# Patient Record
Sex: FEMALE | Race: WHITE | NOT HISPANIC OR LATINO | Employment: UNEMPLOYED | URBAN - METROPOLITAN AREA
[De-identification: names, ages, dates, MRNs, and addresses within clinical notes are randomized per-mention and may not be internally consistent; named-entity substitution may affect disease eponyms.]

---
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
## 2021-07-21 ENCOUNTER — TELEPHONE (OUTPATIENT)
Dept: OTOLARYNGOLOGY | Facility: CLINIC | Age: 21
End: 2021-07-21

## 2021-07-21 NOTE — TELEPHONE ENCOUNTER
[facility] Call Center    Phone Message    May a detailed message be left on voicemail: yes     Reason for Call: Symptoms or Concerns     If patient has red-flag symptoms, warm transfer to triage line    Current symptom or concern: Current ear infection with tube pressing up against inside of ear, pain.    Symptoms have been present for:  1 month(s)    Has patient previously been seen for this? Yes    By : Dr. Dong    Date: 8/7/18    Are there any new or worsening symptoms? Yes: Pt's mom Sania called in. Pt has been experiencing symptoms for about a month, has confirmed ear infection from another provider. Pt has pain. Mom states other provider noted the tube pressed up against the inside of ear and couldn't see through it.      Action Taken: Message routed to:  [facility] ([facility] ENT    Travel Screening: Not Applicable

## 2021-07-21 NOTE — TELEPHONE ENCOUNTER
Pt is having no discharge. Just pain and pressure/plugged feeling. Ran this past Dr. Dong who recommended pt to see Brittney Hallman NP to have her ears examed. No meds as she doesn't think it is an infection. Also it has been almost 3 years since we saw the pt, examination is needed. Pt's mom is agreement with plan.     Marianne Oropeza LPN

## 2021-07-22 ENCOUNTER — TELEPHONE (OUTPATIENT)
Dept: OTOLARYNGOLOGY | Facility: CLINIC | Age: 21
End: 2021-07-22

## 2021-07-22 NOTE — TELEPHONE ENCOUNTER
LVM to Schedule with Brittney Hallman, same day as Iker is here. OK to use return slot. Gave call center number

## 2021-07-26 ENCOUNTER — OFFICE VISIT (OUTPATIENT)
Dept: OTOLARYNGOLOGY | Facility: CLINIC | Age: 21
End: 2021-07-26
Payer: COMMERCIAL

## 2021-07-26 VITALS
BODY MASS INDEX: 29.18 KG/M2 | WEIGHT: 154.54 LBS | HEART RATE: 85 BPM | OXYGEN SATURATION: 99 % | TEMPERATURE: 97.8 F | HEIGHT: 61 IN

## 2021-07-26 DIAGNOSIS — H92.12 OTORRHEA, LEFT: Primary | ICD-10-CM

## 2021-07-26 PROCEDURE — 87186 SC STD MICRODIL/AGAR DIL: CPT | Mod: 90 | Performed by: PATHOLOGY

## 2021-07-26 PROCEDURE — 99203 OFFICE O/P NEW LOW 30 MIN: CPT | Mod: 25 | Performed by: REGISTERED NURSE

## 2021-07-26 PROCEDURE — 87077 CULTURE AEROBIC IDENTIFY: CPT | Mod: 90 | Performed by: PATHOLOGY

## 2021-07-26 PROCEDURE — 92504 EAR MICROSCOPY EXAMINATION: CPT | Performed by: REGISTERED NURSE

## 2021-07-26 PROCEDURE — 87102 FUNGUS ISOLATION CULTURE: CPT | Mod: 90 | Performed by: PATHOLOGY

## 2021-07-26 PROCEDURE — 87070 CULTURE OTHR SPECIMN AEROBIC: CPT | Mod: 90 | Performed by: PATHOLOGY

## 2021-07-26 RX ORDER — PREDNISOLONE ACETATE 10 MG/ML
SUSPENSION/ DROPS OPHTHALMIC
Qty: 10 ML | Refills: 1 | Status: SHIPPED | OUTPATIENT
Start: 2021-07-26 | End: 2021-08-05

## 2021-07-26 RX ORDER — CIPROFLOXACIN HYDROCHLORIDE 3.5 MG/ML
SOLUTION/ DROPS TOPICAL
Qty: 5 ML | Refills: 1 | Status: SHIPPED | OUTPATIENT
Start: 2021-07-26

## 2021-07-26 RX ORDER — CIPROFLOXACIN AND DEXAMETHASONE 3; 1 MG/ML; MG/ML
SUSPENSION/ DROPS AURICULAR (OTIC)
Qty: 7.5 ML | Refills: 0 | Status: SHIPPED | OUTPATIENT
Start: 2021-07-26 | End: 2021-08-05

## 2021-07-26 ASSESSMENT — MIFFLIN-ST. JEOR: SCORE: 1395.44

## 2021-07-26 ASSESSMENT — PAIN SCALES - GENERAL: PAINLEVEL: NO PAIN (0)

## 2021-07-26 NOTE — LETTER
7/26/2021       RE: Migdalia [place]  [address] [facility] [address]     Dear Colleague,    Thank you for referring your patient, Migdalia [place], to the [facility] EAR NOSE AND THROAT CLINIC [place] at [facility]. Please see a copy of my visit note below.      Otolaryngology Clinic  July 26, 2021    HPI:  Migdalia [place] is here for a left t-tube check. Patient had bilateral T tubes placed by Dr. Dong in 2018 for bilateral otitis media with effusions. Patient has been doing well since tube placement. Recently had new left ear pressure, saw PCP who prescribed oral antibiotics for an ear infection. PCP concerned about tube placement and recommended follow up with ENT for tube check. Patient states that left ear continues to have pressure. Hearing became muffled a few days ago, cannot pop ear anymore since hearing became muffled.    Patient denies any otalgia, otorrhea, or dizziness. Patient does not follow strict dry ear precautions but does not have significant ear exposure to water.    Otologic microscope exam:    Biocular Microscopy exam is needed due to deep impaction of cerumen of bilateral ears, requiring direct visualization for use of cleaning instruments.    Right ear was examined under the microscope.  T-tube in place with hard cerumen crusting around tube. Dr. Dong lifted crusting off of drum, could not remove crusting due to intolerance by patient.     Left ear was examined under the microscope.  T-tube visualized. Significant crusting occluding drum with moderate amount of purulent fluid behind crusting. Culture taken. Dr. Dong cleaned crusting with right angle hook around tube. Granulation tissue cleaned with suction. TM red, inflamed.     The patient noted improvement of left ear pressure.    Assessment and Plan:  1. Otorrhea, left  Patient with otitis externa of the left ear requiring ciprodex prescription. Will have patient use drops x 10 days  with follow up in 2 weeks for an ear recheck. Reviewed strict dry ear precautions with patient. Will monitor ear culture and contact patient if there would need to be a change in ear drops.    2. Crusting of right ear T-tube  Patient with crusting surrounding right PE tube. Will have patient use drops to soften crusting 1 week prior to follow up with plan to remove crusting around tube at next visit.      Patient will follow up 2 weeks for ear recheck and cleaning.    Brittney Hallman DNP, APRN, CNP  Otolaryngology  Head & Neck Surgery  [phone]    30 minutes spent on the date of the encounter doing chart review, history and exam, documentation and further activities per the note        Again, thank you for allowing me to participate in the care of your patient.      Sincerely,    Korin Hallman, NP

## 2021-07-26 NOTE — PROGRESS NOTES
Otolaryngology Clinic  July 26, 2021    HPI:  Migdalai [place] is here for a left t-tube check. Patient had bilateral T tubes placed by Dr. Dong in 2018 for bilateral otitis media with effusions. Patient has been doing well since tube placement. Recently had new left ear pressure, saw PCP who prescribed oral antibiotics for an ear infection. PCP concerned about tube placement and recommended follow up with ENT for tube check. Patient states that left ear continues to have pressure. Hearing became muffled a few days ago, cannot pop ear anymore since hearing became muffled.    Patient denies any otalgia, otorrhea, or dizziness. Patient does not follow strict dry ear precautions but does not have significant ear exposure to water.    Otologic microscope exam:    Biocular Microscopy exam is needed due to deep impaction of cerumen of bilateral ears, requiring direct visualization for use of cleaning instruments.    Right ear was examined under the microscope.  T-tube in place with hard cerumen crusting around tube. Dr. Dong lifted crusting off of drum, could not remove crusting due to intolerance by patient.     Left ear was examined under the microscope.  T-tube visualized. Significant crusting occluding drum with moderate amount of purulent fluid behind crusting. Culture taken. Dr. Dong cleaned crusting with right angle hook around tube. Granulation tissue cleaned with suction. TM red, inflamed.     The patient noted improvement of left ear pressure.    Assessment and Plan:  1. Otorrhea, left  Patient with otitis externa of the left ear requiring ciprodex prescription. Will have patient use drops x 10 days with follow up in 2 weeks for an ear recheck. Reviewed strict dry ear precautions with patient. Will monitor ear culture and contact patient if there would need to be a change in ear drops.    2. Crusting of right ear T-tube  Patient with crusting surrounding right PE tube. Will have patient use drops to soften  crusting 1 week prior to follow up with plan to remove crusting around tube at next visit.      Patient will follow up 2 weeks for ear recheck and cleaning.    Brittney Hallman DNP, APRN, CNP  Otolaryngology  Head & Neck Surgery  [phone]    30 minutes spent on the date of the encounter doing chart review, history and exam, documentation and further activities per the note

## 2021-07-26 NOTE — PATIENT INSTRUCTIONS
- You were seen in the ENT Clinic today by Brittney Hallman NP.   - Start ciprodex drops to left ear x 10 days. Use drops in right ear as well about a week before follow up.  - Follow up in 2 weeks with Brittney for an ear recheck.  - Please send a [employer] message or call the ENT clinic at [phone] with any questions or concerns.

## 2021-07-26 NOTE — NURSING NOTE
"Chief Complaint   Patient presents with     RECHECK     follow up       Pulse 85, temperature 97.8  F (36.6  C), temperature source Temporal, height 1.537 m (5' 0.5\"), weight 70.1 kg (154 lb 8.7 oz), SpO2 99 %.    Ankita Horta, EMT    "

## 2021-07-30 LAB — BACTERIA SPEC CULT: ABNORMAL

## 2021-08-12 ENCOUNTER — OFFICE VISIT (OUTPATIENT)
Dept: OTOLARYNGOLOGY | Facility: CLINIC | Age: 21
End: 2021-08-12
Payer: COMMERCIAL

## 2021-08-12 VITALS
HEART RATE: 101 BPM | WEIGHT: 152.12 LBS | HEIGHT: 60 IN | SYSTOLIC BLOOD PRESSURE: 115 MMHG | BODY MASS INDEX: 29.86 KG/M2 | OXYGEN SATURATION: 98 % | TEMPERATURE: 97.5 F | DIASTOLIC BLOOD PRESSURE: 79 MMHG

## 2021-08-12 DIAGNOSIS — H92.12 OTORRHEA, LEFT: Primary | ICD-10-CM

## 2021-08-12 DIAGNOSIS — H73.891 CRUSTED TYMPANIC MEMBRANE OF RIGHT EAR: ICD-10-CM

## 2021-08-12 PROCEDURE — 92504 EAR MICROSCOPY EXAMINATION: CPT | Performed by: REGISTERED NURSE

## 2021-08-12 PROCEDURE — 99213 OFFICE O/P EST LOW 20 MIN: CPT | Mod: 25 | Performed by: REGISTERED NURSE

## 2021-08-12 ASSESSMENT — PAIN SCALES - GENERAL: PAINLEVEL: NO PAIN (0)

## 2021-08-12 ASSESSMENT — MIFFLIN-ST. JEOR: SCORE: 1376.5

## 2021-08-12 NOTE — PATIENT INSTRUCTIONS
- You were seen in the ENT Clinic today by Brittney Hallman NP.   - Both ears are clean without crusting. Tubes are in place. Stop drops, keep ears dry.   - Follow up in 6-9 months for a tube check with Dr. Dong.  - Please send a [employer] message or call the ENT clinic at [phone] with any questions or concerns.

## 2021-08-12 NOTE — NURSING NOTE
Chief Complaint   Patient presents with     RECHECK     2 week follow up      Blood pressure 115/79, pulse 101, temperature 97.5  F (36.4  C), height 1.524 m (5'), weight 69 kg (152 lb 1.9 oz), SpO2 98 %.    Vern Mariscal LPN

## 2021-08-12 NOTE — LETTER
8/12/2021       RE: Migdalia [place]  [address] [facility] [address]     Dear Colleague,    Thank you for referring your patient, Migdalia [place], to the [facility] [place] at [facility]. Please see a copy of my visit note below.      Otolaryngology Clinic  August 12, 2021    HPI:  Migdalia [place] is here for an ear recheck and cleaning after completion of ciprodex drops for left otitis externa. Cultures were taken at last clinic appointment on 7/26/21 which grew Staphylococcus capitis sensitive to ciprofloxacin. At patient's last visit, she also had significant crusting around her right T-tube that was unable to be completely cleaned. Recommended to use drops to right ear to soften crust for further cleaning at follow up appointment.      Patient comes in today with her mother. Patient has been using ear drops as prescribed. Denies any ear pain or drainage.      Otologic microscope exam:    Patient's ear pathology required use of the binocular microscope for the purpose of cleaning and improving visualization in order to assure a more accurate diagnostic evaluation.    Right ear was examined under the microscope.  Significant wet debris surrounding PE tube that appeared intact. It was cleaned with suction. Once cleaned, TM visualized under microscope. T-tube in place with patent lumen.      Left ear was also examined under the microscope.  Small amount of ear drop residue within canal and on TM. No purulent otorrhea or debris observed. T-tube in place and lumen is patent.     Assessment and Plan:  Patient with resolution of left otitis externa after completion of ciprodex drops. Instructed patient that she does not need any further treatment with drops. Continue to keep ear dry as able. Right ear cleaned today with removal of all crusting around T-tube.      Patient will follow up in 6 months for tube recheck with Dr. Dong.    Brittney  Lexx MONTE, APRN, CNP  Otolaryngology  Head & Neck Surgery  [phone]    30 minutes spent on the date of the encounter doing chart review, history and exam, documentation and further activities per the note

## 2021-08-18 NOTE — PROGRESS NOTES
Otolaryngology Clinic  August 12, 2021    HPI:  Migdalia [place] is here for an ear recheck and cleaning after completion of ciprodex drops for left otitis externa. Cultures were taken at last clinic appointment on 7/26/21 which grew Staphylococcus capitis sensitive to ciprofloxacin. At patient's last visit, she also had significant crusting around her right T-tube that was unable to be completely cleaned. Recommended to use drops to right ear to soften crust for further cleaning at follow up appointment.      Patient comes in today with her mother. Patient has been using ear drops as prescribed. Denies any ear pain or drainage.      Otologic microscope exam:    Patient's ear pathology required use of the binocular microscope for the purpose of cleaning and improving visualization in order to assure a more accurate diagnostic evaluation.    Right ear was examined under the microscope.  Significant wet debris surrounding PE tube that appeared intact. It was cleaned with suction. Once cleaned, TM visualized under microscope. T-tube in place with patent lumen.      Left ear was also examined under the microscope.  Small amount of ear drop residue within canal and on TM. No purulent otorrhea or debris observed. T-tube in place and lumen is patent.     Assessment and Plan:  Patient with resolution of left otitis externa after completion of ciprodex drops. Instructed patient that she does not need any further treatment with drops. Continue to keep ear dry as able. Right ear cleaned today with removal of all crusting around T-tube.      Patient will follow up in 6 months for tube recheck with Dr. Dong.    Brittney Hallman DNP, APRN, CNP  Otolaryngology  Head & Neck Surgery  [phone]    30 minutes spent on the date of the encounter doing chart review, history and exam, documentation and further activities per the note

## 2021-08-23 LAB — BACTERIA SPEC CULT: NO GROWTH

## 2021-11-20 ENCOUNTER — HEALTH MAINTENANCE LETTER (OUTPATIENT)
Age: 21
End: 2021-11-20

## 2022-03-22 ENCOUNTER — TELEPHONE (OUTPATIENT)
Dept: OTOLARYNGOLOGY | Facility: CLINIC | Age: 22
End: 2022-03-22
Payer: COMMERCIAL

## 2022-03-22 NOTE — TELEPHONE ENCOUNTER
Last seen 8/17/18 by Brittney- told to stop the drops, use dry ear precautions and tubes were in tacked.     Pt had a virtual appt on Saturday where they prescribed ofloxacin. She has been using it but her ear continues to feel plugged. No drainage or change in hearing. Still has her tubes that she is aware of. She had a fever/cold last week. Agreed to an appt with Brittney CONNOLLY to check her ears. No further questions.    Marianne Oropeza LPN

## 2022-03-22 NOTE — TELEPHONE ENCOUNTER
[facility] Call Center    Phone Message    May a detailed message be left on voicemail: yes     Reason for Call: Symptoms or Concerns     If patient has red-flag symptoms, warm transfer to triage line    Current symptom or concern: Pt has ear infection of Lt ear, completely plugged, was having pain but no longer is. States she is on drops.    Symptoms have been present for:  4 day(s)    Has patient previously been seen for this? Yes    By : Brittney Hallman NP    Date: 8/12/21    Are there any new or worsening symptoms? No      Action Taken: Message routed to:  [facility] ENT    Travel Screening: Not Applicable

## 2022-03-24 ENCOUNTER — OFFICE VISIT (OUTPATIENT)
Dept: OTOLARYNGOLOGY | Facility: CLINIC | Age: 22
End: 2022-03-24
Payer: COMMERCIAL

## 2022-03-24 VITALS
DIASTOLIC BLOOD PRESSURE: 79 MMHG | SYSTOLIC BLOOD PRESSURE: 130 MMHG | WEIGHT: 152 LBS | HEART RATE: 101 BPM | BODY MASS INDEX: 29.84 KG/M2 | HEIGHT: 60 IN | TEMPERATURE: 96.1 F

## 2022-03-24 DIAGNOSIS — Z96.22 BILATERAL PATENT PRESSURE EQUALIZATION (PE) TUBES: ICD-10-CM

## 2022-03-24 DIAGNOSIS — H92.12 OTORRHEA, LEFT: Primary | ICD-10-CM

## 2022-03-24 PROCEDURE — 92504 EAR MICROSCOPY EXAMINATION: CPT | Performed by: REGISTERED NURSE

## 2022-03-24 PROCEDURE — 99213 OFFICE O/P EST LOW 20 MIN: CPT | Mod: 25 | Performed by: REGISTERED NURSE

## 2022-03-24 RX ORDER — CIPROFLOXACIN AND DEXAMETHASONE 3; 1 MG/ML; MG/ML
3-4 SUSPENSION/ DROPS AURICULAR (OTIC) 2 TIMES DAILY
Qty: 7.5 ML | Refills: 0 | Status: SHIPPED | OUTPATIENT
Start: 2022-03-24 | End: 2022-03-25

## 2022-03-24 ASSESSMENT — PAIN SCALES - GENERAL: PAINLEVEL: NO PAIN (0)

## 2022-03-24 NOTE — PATIENT INSTRUCTIONS
1. You were seen in the ENT Clinic today by Dr. Dong.  If you have any questions or concerns after your appointment, please call   - Option 1: ENT Clinic: [phone]   - Option 2: Marianne (Dr. Dong's Nurse): [phone]                   Trupti(Dr. Dong's Nurse): [phone]    2.   Plan to return to clinic in 2 weeks    3. Ciprodex to left ear- 3 drops twice daily x 10 days    Marianne Oropeza LPN  [facility] - Otol[facility]ology

## 2022-03-24 NOTE — NURSING NOTE
Chief Complaint   Patient presents with     RECHECK     ear pressure/plugged feeling    Blood pressure 130/79, pulse 101, temperature (!) 96.1  F (35.6  C), temperature source Temporal, height 1.524 m (5'), weight 68.9 kg (152 lb). Gila Griffith, EMT

## 2022-03-24 NOTE — PROGRESS NOTES
Otolaryngology Clinic  March 24, 2022    HPI:  Migdalia Hayes is here for a recheck of their left ear.  Patient has a history of bilateral otitis media with effusions.  Bilateral T tubes placed by Dr. Dong in 2018.  Patient was last seen in the summer 2021 for left ear pressure and bilateral crusting of tubes.  Patient was prescribed drops and ears were thoroughly cleaned.    Today, patient reports that she has been doing well until this past week where she began to have left ear pain and pressure. Was seen via virtual visit and prescribed ofloxacin that improved pain but not pressure. Denies any drainage. Patient did have some nasal congestion prior to ear symptoms. Denies dizziness. No symptoms on right side.     Otologic microscope exam:    Patient's ear pathology required use of the binocular microscope for the purpose of cleaning and improving visualization in order to assure a more accurate diagnostic evaluation.    Right ear was examined under the microscope. Crusting in canal removed with alligator forceps. T-tube visualized and in good position. Middle ear appears well aerated. T-tube with patent lumen     Left ear was also examined under the microscope. Significant crusting around T-tube. Retraction of anterior TM where T-tube is in place. Crusting along remainder of TM. Crusting on TM was removed with right angle hook and alligator forceps. Crusting around TM was partially removed. T tube lumen is patent.    Assessment and Plan:  1. Otorrhea, left  Patient with significant crusting around PE tube and erythema of TM. Will have patient use drops to left ear x 10 days with plan for follow up in 2 weeks. T-tube may need to be removed due to significant crusting and thin appearance of TM around tube.     Right PE tube is in good position without significant drainage.    Patient will follow up in 2 weeks for left tube recheck. Patient to schedule on a day when Dr. Dong is in clinic for possible PE tube  replacement.     Brittney Hallman DNP, APRN, CNP  Otolaryngology  Head & Neck Surgery  [phone]    20 minutes spent on the date of the encounter doing chart review, history and exam, documentation and further activities per the note excluding time performing microscopy ear exam.

## 2022-03-24 NOTE — LETTER
3/24/2022       RE: Migdalia Hayes  [address] [facility] [address]     Dear Colleague,    Thank you for referring your patient, Migdalia Hayes, to the [facility] EAR NOSE AND THROAT CLINIC [place] at [facility]. Please see a copy of my visit note below.      Otolaryngology Clinic  March 24, 2022    HPI:  Migdalia Hayes is here for a recheck of their left ear.  Patient has a history of bilateral otitis media with effusions.  Bilateral T tubes placed by Dr. Dong in 2018.  Patient was last seen in the summer 2021 for left ear pressure and bilateral crusting of tubes.  Patient was prescribed drops and ears were thoroughly cleaned.    Today, patient reports that she has been doing well until this past week where she began to have left ear pain and pressure. Was seen via virtual visit and prescribed ofloxacin that improved pain but not pressure. Denies any drainage. Patient did have some nasal congestion prior to ear symptoms. Denies dizziness. No symptoms on right side.     Otologic microscope exam:    Patient's ear pathology required use of the binocular microscope for the purpose of cleaning and improving visualization in order to assure a more accurate diagnostic evaluation.    Right ear was examined under the microscope. Crusting in canal removed with alligator forceps. T-tube visualized and in good position. Middle ear appears well aerated. T-tube with patent lumen     Left ear was also examined under the microscope. Significant crusting around T-tube. Retraction of anterior TM where T-tube is in place. Crusting along remainder of TM. Crusting on TM was removed with right angle hook and alligator forceps. Crusting around TM was partially removed. T tube lumen is patent.    Assessment and Plan:  1. Otorrhea, left  Patient with significant crusting around PE tube and erythema of TM. Will have patient use drops to left ear x 10 days with plan for follow up in 2  weeks. T-tube may need to be removed due to significant crusting and thin appearance of TM around tube.     Right PE tube is in good position without significant drainage.    Patient will follow up in 2 weeks for left tube recheck. Patient to schedule on a day when Dr. Dong is in clinic for possible PE tube replacement.     Brittney Hallman DNP, APRN, CNP  Otolaryngology  Head & Neck Surgery  [phone]    20 minutes spent on the date of the encounter doing chart review, history and exam, documentation and further activities per the note excluding time performing microscopy ear exam.        Again, thank you for allowing me to participate in the care of your patient.      Sincerely,    Korin Hallman, NP

## 2022-03-25 RX ORDER — PREDNISOLONE ACETATE 10 MG/ML
3-4 SUSPENSION/ DROPS OPHTHALMIC 2 TIMES DAILY
Qty: 10 ML | Refills: 0 | Status: SHIPPED | OUTPATIENT
Start: 2022-03-25 | End: 2023-07-20

## 2022-03-25 RX ORDER — CIPROFLOXACIN HYDROCHLORIDE 3.5 MG/ML
3-4 SOLUTION/ DROPS TOPICAL 2 TIMES DAILY
Qty: 10 ML | Refills: 0 | Status: SHIPPED | OUTPATIENT
Start: 2022-03-25 | End: 2023-04-13

## 2022-03-25 NOTE — ADDENDUM NOTE
Addended by: KIM PLASENCIA on: 3/25/2022 08:16 AM     Modules accepted: Orders    
[facility]

## 2022-04-21 ENCOUNTER — OFFICE VISIT (OUTPATIENT)
Dept: OTOLARYNGOLOGY | Facility: CLINIC | Age: 22
End: 2022-04-21
Payer: COMMERCIAL

## 2022-04-21 VITALS
DIASTOLIC BLOOD PRESSURE: 75 MMHG | HEIGHT: 60 IN | HEART RATE: 75 BPM | OXYGEN SATURATION: 100 % | BODY MASS INDEX: 29.84 KG/M2 | SYSTOLIC BLOOD PRESSURE: 110 MMHG | TEMPERATURE: 98.5 F | WEIGHT: 152 LBS

## 2022-04-21 DIAGNOSIS — H92.12 OTORRHEA, LEFT: Primary | ICD-10-CM

## 2022-04-21 DIAGNOSIS — T85.898A OBSTRUCTION OF PRESSURE EQUALIZATION TUBE, INITIAL ENCOUNTER: ICD-10-CM

## 2022-04-21 PROCEDURE — 92504 EAR MICROSCOPY EXAMINATION: CPT | Performed by: REGISTERED NURSE

## 2022-04-21 ASSESSMENT — PAIN SCALES - GENERAL: PAINLEVEL: NO PAIN (0)

## 2022-04-21 NOTE — NURSING NOTE
Chief Complaint   Patient presents with     RECHECK     Follow up    Blood pressure 110/75, pulse 75, temperature 98.5  F (36.9  C), temperature source Temporal, height 1.524 m (5'), weight 68.9 kg (152 lb), SpO2 100 %. Gila Griffith, EMT

## 2022-04-21 NOTE — LETTER
4/21/2022       RE: Migdalia Hayes  [address] [facility] [address]     Dear Colleague,    Thank you for referring your patient, Migdalia Hayes, to the [facility] EAR NOSE AND THROAT CLINIC [place] at [facility]. Please see a copy of my visit note below.      Otolaryngology Clinic  April 21, 2022    HPI:  Migdalia Hayes is here for a recheck of their left ear. Patient has a history of bilateral T-tubes for chronic otitis media with effusions. Last seen in clinic 3/24/22 for left ear pain and pressure. Found to have significant crusting around T-tube and erythema of the TM. Prescribed Ciprodex with follow up in 10 days.    Today, patient state that left ear is back to baseline today. Denies any pain or drainage. Hearing is back to baseline. No problems in the right ear.     Otologic microscope exam:    Patient's ear pathology required use of the binocular microscope for the purpose of cleaning and improving visualization in order to assure a more accurate diagnostic evaluation.    Right ear was examined under the microscope. T-tube in good position with patent lumen. No drainage or crusting around tube. Middle ear appears well aerated.     Left ear was also examined under the microscope.T-tube angled superiorly with base of tube beginning to extrude from TM. There is no edema or drainage in the ear. Significant crusting surrounding tube. Removed with right angled hook and alligator forceps that eventually led to total extrusion of T-tube. Perforation of TM in anterior TM widely patent without drainage of middle ear.     Discussed replacing tube in clinic today because perforation is widely patent. Patient agreeable to replacement in clinic. Neurotology fellow, Dr. Hernandez, replaced T-tube in clinic without difficulty. Patient tolerated procedure well without complications.     Assessment and Plan:  1. Otorrhea, left  The patient presents for recheck of left ear  for treatment of left otorrhea and TM erythema. Otorrhea and erythema resolved with ciprodex medication. T-tube was replaced today due to extrusion of previous T-tube. Patient tolerated replace of T-tube well.     Patient should return to clinic in 6 months for tube recheck. Patient to contact clinic for a sooner appointment if she experiences any new drainage, pain, or change in hearing.    20 minutes spent on the date of the encounter doing chart review, history and exam, documentation and further activities per the note excluding ear cleaning and T-tube placement under microscopy.        Again, thank you for allowing me to participate in the care of your patient.      Sincerely,    Korin Hallman NP

## 2022-04-21 NOTE — PROGRESS NOTES
Otolaryngology Clinic  April 21, 2022    HPI:  Migdalia Hayes is here for a recheck of their left ear. Patient has a history of bilateral T-tubes for chronic otitis media with effusions. Last seen in clinic 3/24/22 for left ear pain and pressure. Found to have significant crusting around T-tube and erythema of the TM. Prescribed Ciprodex with follow up in 10 days.    Today, patient state that left ear is back to baseline today. Denies any pain or drainage. Hearing is back to baseline. No problems in the right ear.     Otologic microscope exam:    Patient's ear pathology required use of the binocular microscope for the purpose of cleaning and improving visualization in order to assure a more accurate diagnostic evaluation.    Right ear was examined under the microscope. T-tube in good position with patent lumen. No drainage or crusting around tube. Middle ear appears well aerated.     Left ear was also examined under the microscope.T-tube angled superiorly with base of tube beginning to extrude from TM. There is no edema or drainage in the ear. Significant crusting surrounding tube. Removed with right angled hook and alligator forceps that eventually led to total extrusion of T-tube. Perforation of TM in anterior TM widely patent without drainage of middle ear.     Discussed replacing tube in clinic today because perforation is widely patent. Patient agreeable to replacement in clinic. Neurotology fellow, Dr. Hernandez, replaced T-tube in clinic without difficulty. Patient tolerated procedure well without complications.     Assessment and Plan:  1. Otorrhea, left  The patient presents for recheck of left ear for treatment of left otorrhea and TM erythema. Otorrhea and erythema resolved with ciprodex medication. T-tube was replaced today due to extrusion of previous T-tube. Patient tolerated replace of T-tube well.     Patient should return to clinic in 6 months for tube recheck. Patient to contact clinic for a sooner  appointment if she experiences any new drainage, pain, or change in hearing.    Brittney Hallman DNP, APRN, CNP  Otolaryngology  Head & Neck Surgery  [phone]    20 minutes spent on the date of the encounter doing chart review, history and exam, documentation and further activities per the note excluding ear cleaning and T-tube placement under microscopy.

## 2023-03-21 ENCOUNTER — OFFICE VISIT (OUTPATIENT)
Dept: OTOLARYNGOLOGY | Facility: CLINIC | Age: 23
End: 2023-03-21
Payer: COMMERCIAL

## 2023-03-21 VITALS
HEART RATE: 102 BPM | HEIGHT: 60 IN | BODY MASS INDEX: 29.25 KG/M2 | SYSTOLIC BLOOD PRESSURE: 122 MMHG | WEIGHT: 149 LBS | DIASTOLIC BLOOD PRESSURE: 76 MMHG

## 2023-03-21 DIAGNOSIS — H92.13 OTORRHEA, BILATERAL: Primary | ICD-10-CM

## 2023-03-21 PROCEDURE — 99213 OFFICE O/P EST LOW 20 MIN: CPT | Mod: 25 | Performed by: REGISTERED NURSE

## 2023-03-21 PROCEDURE — 92504 EAR MICROSCOPY EXAMINATION: CPT | Performed by: REGISTERED NURSE

## 2023-03-21 RX ORDER — CIPROFLOXACIN AND DEXAMETHASONE 3; 1 MG/ML; MG/ML
4 SUSPENSION/ DROPS AURICULAR (OTIC) 2 TIMES DAILY
Qty: 7.5 ML | Refills: 0 | Status: SHIPPED | OUTPATIENT
Start: 2023-03-21 | End: 2023-04-04

## 2023-03-21 RX ORDER — SUMATRIPTAN 50 MG/1
TABLET, FILM COATED ORAL
COMMUNITY
Start: 2023-03-09

## 2023-03-21 ASSESSMENT — PAIN SCALES - GENERAL: PAINLEVEL: NO PAIN (0)

## 2023-03-21 NOTE — PROGRESS NOTES
Otolaryngology Clinic  March 21, 2023    HPI:  Migdalia Hayes is here for a recheck of their bilateral ears. Patient has a history of bilateral T-tubes for chronic otitis media with effusions. Last seen in clinic 4/21/22 where left T-tube was replaced due to significant crusting around tube leading to extrusion.     Today, patient returns for a recheck. Denies any episodes of drainage. No significant pain. Left ear with new feeling of plugging.     Otologic microscope exam:    Patient's ear pathology required use of the binocular microscope for the purpose of cleaning and improving visualization in order to assure a more accurate diagnostic evaluation.    Right ear was examined under the microscope.  Ear canal is clean and dry. Significant crusting around T-tube partially removed to reveal granulation tissue on TM and around superior perforation. Unable to remove the rest of crusting without removing tube. Upon further inspection, one leg of T-tube has already extruded. T-tube removed. Once removed, perforation visualized under microscope. Granulation tissue at superior portion of perforation, erythema surrounding perforation. Clean middle ear space.      Left ear was also examined under the microscope. Significant crusting around T-tube that was lifted off TM to reveal small area of purulent fluid at inferior TM. Further cleaning of crusting extruded T-tube which was removed. Moisture surrounding perforation with erythema. Clean middle ear space.      Assessment and Plan:  1. Otorrhea, bilateral  The patient presents for recheck of bilateral T-tubes. Significant crusting around T-tube against TM causing granulation tissue, erythema, and moisture. Both T-tubes extruded during cleaning of crusting. Will have patient use ciprodex drops bilaterally to treat otorrhea and inflammation. Return in 3 weeks with Dr. Dong for recheck of ear and possible replacement of T-tubes.     Brittney Hallman DNP, APRN,  [facility]  Otolaryngology  Head & Neck Surgery  [phone]    20 minutes spent on the date of the encounter doing chart review, history and exam, documentation and further activities per the note excluding time spent examining and cleaning ears under microscopy.

## 2023-03-21 NOTE — LETTER
3/21/2023       RE: Migdalia Hayes  [address] [place] [id number]     Dear Colleague,    Thank you for referring your patient, Migdalia Hayes, to the [facility] EAR NOSE AND THROAT CLINIC [place] at [facility]. Please see a copy of my visit note below.      Otolaryngology Clinic  March 21, 2023    HPI:  Migdalia Hayes is here for a recheck of their bilateral ears. Patient has a history of bilateral T-tubes for chronic otitis media with effusions. Last seen in clinic 4/21/22 where left T-tube was replaced due to significant crusting around tube leading to extrusion.     Today, patient returns for a recheck. Denies any episodes of drainage. No significant pain. Left ear with new feeling of plugging.     Otologic microscope exam:    Patient's ear pathology required use of the binocular microscope for the purpose of cleaning and improving visualization in order to assure a more accurate diagnostic evaluation.    Right ear was examined under the microscope.  Ear canal is clean and dry. Significant crusting around T-tube partially removed to reveal granulation tissue on TM and around superior perforation. Unable to remove the rest of crusting without removing tube. Upon further inspection, one leg of T-tube has already extruded. T-tube removed. Once removed, perforation visualized under microscope. Granulation tissue at superior portion of perforation, erythema surrounding perforation. Clean middle ear space.      Left ear was also examined under the microscope. Significant crusting around T-tube that was lifted off TM to reveal small area of purulent fluid at inferior TM. Further cleaning of crusting extruded T-tube which was removed. Moisture surrounding perforation with erythema. Clean middle ear space.      Assessment and Plan:  1. Otorrhea, bilateral  The patient presents for recheck of bilateral T-tubes. Significant crusting around T-tube  against TM causing granulation tissue, erythema, and moisture. Both T-tubes extruded during cleaning of crusting. Will have patient use ciprodex drops bilaterally to treat otorrhea and inflammation. Return in 3 weeks with Dr. Dong for recheck of ear and possible replacement of T-tubes.     Brittney Hallman DNP, APRN, CNP  Otolaryngology  Head & Neck Surgery  [phone]    20 minutes spent on the date of the encounter doing chart review, history and exam, documentation and further activities per the note excluding time spent examining and cleaning ears under microscopy.

## 2023-03-21 NOTE — PATIENT INSTRUCTIONS
- You were seen in the ENT Clinic today by Brittney Hallman NP.   - Use drops to bilateral ears as prescribed.   - Follow up with Dr. Dong in 3 weeks.   - Please send a [employer] message or call the ENT clinic at [phone] with any questions or concerns.

## 2023-04-13 ENCOUNTER — OFFICE VISIT (OUTPATIENT)
Dept: OTOLARYNGOLOGY | Facility: CLINIC | Age: 23
End: 2023-04-13
Payer: COMMERCIAL

## 2023-04-13 DIAGNOSIS — H69.93 DYSFUNCTION OF BOTH EUSTACHIAN TUBES: Primary | ICD-10-CM

## 2023-04-13 PROCEDURE — 92504 EAR MICROSCOPY EXAMINATION: CPT | Performed by: OTOLARYNGOLOGY

## 2023-04-13 PROCEDURE — 99203 OFFICE O/P NEW LOW 30 MIN: CPT | Mod: 25 | Performed by: OTOLARYNGOLOGY

## 2023-04-13 RX ORDER — DEXAMETHASONE SODIUM PHOSPHATE 4 MG/ML
10 INJECTION, SOLUTION INTRA-ARTICULAR; INTRALESIONAL; INTRAMUSCULAR; INTRAVENOUS; SOFT TISSUE ONCE
Status: CANCELLED | OUTPATIENT
Start: 2023-04-13 | End: 2023-04-13

## 2023-04-13 RX ORDER — CLINDAMYCIN PHOSPHATE 900 MG/50ML
900 INJECTION, SOLUTION INTRAVENOUS
Status: CANCELLED | OUTPATIENT
Start: 2023-04-13

## 2023-04-13 RX ORDER — CLINDAMYCIN PHOSPHATE 900 MG/50ML
900 INJECTION, SOLUTION INTRAVENOUS SEE ADMIN INSTRUCTIONS
Status: CANCELLED | OUTPATIENT
Start: 2023-04-13

## 2023-04-13 RX ORDER — ACETAMINOPHEN 325 MG/1
975 TABLET ORAL ONCE
Status: CANCELLED | OUTPATIENT
Start: 2023-04-13 | End: 2023-04-13

## 2023-04-13 ASSESSMENT — PAIN SCALES - GENERAL: PAINLEVEL: NO PAIN (0)

## 2023-04-13 NOTE — PATIENT INSTRUCTIONS
1. You were seen in the ENT Clinic today by Dr. Dong.  If you have any questions or concerns after your appointment, please call   - Option 1: ENT Clinic: [phone]   - Option 2: Marianne (Dr. Dong's Nurse): [phone]          lEy (Dr. Dong's Nurse): [phone]     2.   Plan to return to clinic for post op appt    How to Contact Us:  Send a [facility] message to your provider. Our team will respond to you via [facility]. Occasionally, we will need to call you to get further information.  For urgent matters (Monday-Friday), call the ENT Clinic: [phone] and speak with a call center team member - they will route your call appropriately.   If you'd like to speak directly with a nurse, please find our contact information below. We do our best to check voicemail frequently throughout the day, and will work to call you back within 1-2 days. For urgent matters, please use the general clinic phone numbers listed above.        Marianne BLACK LPN  [facility] - Otolaryngology

## 2023-04-13 NOTE — LETTER
2023      RE: Migdalia Hayes  [address] [place] [id number]         Otolaryngology Clinic      Name: Migdalia Hayes  MRN: [id number]  Age: 22 year old  : 2023      Chief Complaint:   Follow up    History of Present Illness:   Migdalia Hayes is a 22 year old female with a history of bilateral eustachian tube dysfunction with t-tubes in place who presents for follow up. She was most recently seen by my colleague Brittney Hallman NP on 3/21/23 for recheck of her bilateral t-tubes, which extruded with cleaning at the time. She presents today for recheck and possible replacement.    Today, she reports that the first week after the tubes were removed, she couldn't hear out of the left ear, and it felt plugged. This then switched to the right and now she can't hear out of both ears.      Physical examination:  Female in no acute distress.  Alert and answering questions appropriately.  HB 1/6 bilaterally.  Bilateral ears examined under the microscope.  - Left: Very small perforation with associated crusting. Thin anterior portion of TM over epitympanum without retraction. Well aerated middle ear.  - Right: Very small perforation with associated crusting. Thin anterior portion of TM over epitympanum without retraction. Well aerated middle ear.    Assessment and Plan:  Migdalia Hayes is a 22 year old female with a history of bilateral eustachian tube dysfunction with t-tubes in place who presents for follow up. On exam, there is a very small perforation on both eardrums, which appears to be healing. I explained that as her tympanic membranes are very thin resulting in difficulty retaining PE tubes, she may benefit from Mayo tubes. Risks and benefits of surgery were discussed. Risks include but are not limited to: TM perforation, continued retraction, infection and need for further surgery. Postoperative restrictions were also reviewed. We discussed that Mayo tubes are not forever and can  still extrude or be blocked. She expressed understanding and wished to proceed. We will reach out to the patient to begin the scheduling process.      Scribe Disclosure:  I, Elvira Vargas, am serving as a scribe to document services personally performed by Lily Dong MD at this visit, based upon the provider's statements to me. All documentation has been reviewed by the aforementioned provider prior to being entered into the official medical record.     The documentation recorded by the scribe accurately reflects the services I personally performed and the decisions made by me.          Lily Dong MD

## 2023-04-13 NOTE — PROGRESS NOTES
Otolaryngology Clinic      Name: Migdalia Hayes  MRN: [id number]  Age: 22 year old  : 2023      Chief Complaint:   Follow up    History of Present Illness:   Migdalia Hayes is a 22 year old female with a history of bilateral eustachian tube dysfunction with t-tubes in place who presents for follow up. She was most recently seen by my colleague Brittney Hallman NP on 3/21/23 for recheck of her bilateral t-tubes, which extruded with cleaning at the time. She presents today for recheck and possible replacement.    Today, she reports that the first week after the tubes were removed, she couldn't hear out of the left ear, and it felt plugged. This then switched to the right and now she can't hear out of both ears.      Physical examination:  Female in no acute distress.  Alert and answering questions appropriately.  HB 1/6 bilaterally.  Bilateral ears examined under the microscope.  - Left: Very small perforation with associated crusting. Thin anterior portion of TM over epitympanum without retraction. Well aerated middle ear.  - Right: Very small perforation with associated crusting. Thin anterior portion of TM over epitympanum without retraction. Well aerated middle ear.    Assessment and Plan:  Migdalia Hayes is a 22 year old female with a history of bilateral eustachian tube dysfunction with t-tubes in place who presents for follow up. On exam, there is a very small perforation on both eardrums, which appears to be healing. I explained that as her tympanic membranes are very thin resulting in difficulty retaining PE tubes, she may benefit from Mayo tubes. Risks and benefits of surgery were discussed. Risks include but are not limited to: TM perforation, continued retraction, infection and need for further surgery. Postoperative restrictions were also reviewed. We discussed that Mayo tubes are not forever and can still extrude or be blocked. She expressed understanding and wished to proceed. We will  reach out to the patient to begin the scheduling process.      Scribe Disclosure:  I, Elvira Vargas, am serving as a scribe to document services personally performed by Lily Dong MD at this visit, based upon the provider's statements to me. All documentation has been reviewed by the aforementioned provider prior to being entered into the official medical record.     The documentation recorded by the scribe accurately reflects the services I personally performed and the decisions made by me.

## 2023-04-13 NOTE — NURSING NOTE
Chief Complaint   Patient presents with     RECHECK     3 week follow up       Vern Mariscal LPN

## 2023-04-21 ENCOUNTER — TELEPHONE (OUTPATIENT)
Dept: OTOLARYNGOLOGY | Facility: CLINIC | Age: 23
End: 2023-04-21
Payer: COMMERCIAL

## 2023-04-21 PROBLEM — H69.93 DYSFUNCTION OF BOTH EUSTACHIAN TUBES: Status: ACTIVE | Noted: 2023-04-21

## 2023-04-21 NOTE — TELEPHONE ENCOUNTER
Called patient to schedule surgery with Dr. Dong    Date of Surgery: 7/28    Location of surgery: [facility]    Pre-Op H&P: PCP    Pre/Post Imaging:  No    Post-Op Appt Date: 3 weeks    Surgery Packet Mailed: yes    Additional comments: Spoke with patient, they are aware of above date, will review packet and reach out with any questions           Anna C. Schoenecker on 4/21/2023 at 12:53 PM

## 2023-04-22 ENCOUNTER — HEALTH MAINTENANCE LETTER (OUTPATIENT)
Age: 23
End: 2023-04-22

## 2023-05-16 ENCOUNTER — MYC MEDICAL ADVICE (OUTPATIENT)
Dept: OTOLARYNGOLOGY | Facility: CLINIC | Age: 23
End: 2023-05-16
Payer: COMMERCIAL

## 2023-05-17 NOTE — TELEPHONE ENCOUNTER
Patient called back and accepted the 6/19 date. Let patient know she will need a pre op physical within 30 days of the new date. Patient understood and had no further questions    Dorothy Chaparro, Perioperative Coordinator, ENT 5/17/2023 at 4:11 PM

## 2023-05-17 NOTE — TELEPHONE ENCOUNTER
Left patient a voicemail requesting to move surgery up to 6/19    Dorothy Chaparro, Perioperative Coordinator, ENT 5/17/2023 at 1:23 PM

## 2023-05-18 NOTE — TELEPHONE ENCOUNTER
Patient left a voicemail stating she received a message about her July surgery. Left her a voicemail letting her know she is good for 6/19 and to disregard her old surgery date    Dorothy Chaparro, Perioperative Coordinator, ENT 5/18/2023 at 12:43 PM

## 2023-05-30 NOTE — H&P (VIEW-ONLY)
[facility]      Preoperative Consultation   Migdalia Hayes   : 2000   Gender: female    Date of Encounter: 2023    Migdalia Hayes is a 22 y.o. female (2000) who presents for preop evaluation undergoing bilateral tube in ears.      Date of Surgery: 23  Surgical Specialty: ENT   Hospital/Surgical Facility: Dr. Lily Dong   Fax number:   Phone number: [phone]  Surgery type: outpatient  Primary Physician: Clinic, [facility]      History of Present Illness     Migdalia Hayes is a 23 yo female with past medical history of migraines, exercise induced asthma who presents to the clinic for a pre-op clearance due to otorrhea bilateral. Has been following with ENT. Has a T-tubes for chronic otitis media and effusions but recently they fell out, plans to get tubes placed again.     Personal/Family History of Anesthetic Reaction? no  Personal/Family History of easy bruising/bleeding disorder? no  Personal History of Snoring/Sleep Apnea? no  Personal/Family History of VTE/PE? no  Smoking History? Non-smoker  Exercise capacity:  Able to go up flight of stairs carrying bag of groceries without symptoms? yes       Patient reports tachycardia for the last couple of months. Reports her resting HR has been around 115.   Sometimes has palpitations.   Denies chest pain, dyspnea on exertion, nausea, vomiting, leg edema.   She started Amitriptyline 3/2023 for migraine prophylaxis, thinks this is around the time that she noticed tachycardia.   She has been increasing Amitriptyline, now at 50 mg daily. Does not feel this has improved her migraines. Continues to have 4-5 migraines a week that last all day.   She was prescribed Sumatriptan, but this has not been beneficial.   She has neurology consult next week.          Review of Systems   A comprehensive review of systems was negative except for items noted in HPI.    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     levonorgestrel-ethinyl estrad, 0.1mg-20mcg, (ALESSE-28) 0.1-20 mg-mcg tablet Take 1 Tablet by mouth once daily.     SUMAtriptan (IMITREX) 50 mg tablet Take one tablet by mouth at onset of migraine symptoms. Give at minimum 2hrs apart. Max Dose: 200mg per 24hrs.     Ventolin HFA 90 mcg/actuation inhaler INHALE TWO PUFFS BY MOUTH EVERY 4 HOURS AS NEEDED FOR WHEEZING     No current facility-administered medications for this visit.     Medications have been reviewed by me and are current to the best of my knowledge and ability.     Allergies   Allergen Reactions     Cefdinir Rash     Cephalexin Rash     Polytrim [Polymyxin B Sulf-Trimethoprim] Edema     Eye swelling     Zithromax [Azithromycin] Hives     11/14/11 PROBABLY from zithro (would need retest to prove)      Past Surgical History:   . Laterality Date     ADENOIDECTOMY  2005     MYRINGOTOMY      PE tube placement in 2005, 2004, 2003, 2002     WISDOM TEETH EXTRACTION       Social History     Tobacco Use     Smoking status: Never     Smokeless tobacco: Never   Vaping Use     Vaping Use: Never used   Substance Use Topics     Alcohol use: Yes     Comment: occ     Drug use: No     Family History   Problem Relation Age of Onset     Good Health Mother      Good Health Father      Good Health Brother      Diabetes Paternal Grandfather      Heart Disease Paternal Grandfather      Genetic Neg.         No MR, SIDS, diabetes, genetic illnesses       PAST DIFFICULTY WITH ANESTHESIA: None     Physical Exam   /72 (Cuff Site: Left Arm, Position: Sitting, Cuff Size: Adult Regular)   Pulse (!) 108   Temp 97.8  F (36.6  C) (Oral)   Ht 1.56 m (5' 1.42\")   Wt 68 kg (150 lb)   " LMP 05/20/2023 (Exact Date)   SpO2 99%   BMI 27.96 kg/m   Body mass index is 27.96 kg/m .    General Appearance: Pleasant, alert, appropriate appearance for age. No acute distress  Eye Exam: Normal external eye, conjunctiva, lids, cornea. KARI.  Ear Exam: Normal TM's bilaterally. Normal auditory canals and external ears. Non-tender.  Nose Exam: Normal external nose, mucous membranes, and septum.  OroPharynx Exam: Dental hygiene adequate. Normal buccal mucosa. Normal pharynx.  Neck Exam: Supple, no masses or nodes.  Thyroid Exam: No nodules or enlargement.  Chest/Respiratory Exam: Normal chest wall and respirations. Clear to auscultation.  Cardiovascular Exam: Tachycardic, normal rhythm. S1, S2, no murmur, click, gallop, or rubs.  Gastrointestinal Exam: Soft, nontender, no abnormal masses or organomegaly.  Musculoskeletal Exam: Back is straight and non-tender, full ROM of upper and lower extremities.  Skin: no rash or abnormalities  Neurologic Exam: Normal gross motor movement, tone, and coordination. No tremor.  Psychiatric Exam: Alert and oriented, appropriate affect.     Assessment / Plan         The Pre-Op Tool    Recommendations      Low Risk Procedure    Cardiac History  No history of coronary artery disease           Labs  No routine labs indicated  EKG  Not indicated  Stress Testing  Not indicated    * Testing recommendations are intended to assist, but not direct, clinical decisions.           Take your other medications as usual prior to the procedure  Hold vitamins and/or supplements for 1 week prior to the procedure  Hold fish oil for 2 weeks prior to the procedure  Okay to take Acetaminophen (Tylenol) up until the procedure  Hold / avoid NSAIDs (e.g. ibuprofen, naproxen) prior to procedure: 2 days for ibuprofen (Advil) and 4 days for naproxen (Aleve).    * Medication recommendations are not intended to be exhaustive; they are limited to common medications that are potentially dangerous if incorrectly  managed          Labs  * Data supports elimination of  routine  laboratory testing in favor of focused,  indicated  testing based on medical co-morbidities. A 2009 study randomized 1061 patients undergoing ambulatory, non-cataract surgery to routine or to indicated testing. Perioperative adverse events were similar (Anesthesia & Analgesia 2009;108:467-75; Anesthesiol. Clin. 2016 Mar;34(1):43-58).  EKG  * The ACC/AHA recommends against obtaining routine EKGs in patients undergoing low risk surgeries, a class IIa recommendation (JACC. 2014;64(21);e1-76).     Session ID: [id number]  Endnotes and bibliography available upon request: [email or web address]      Labs: yes  ECG: yes    ICD-10-CM    1. Pre-operative clearance  Z01.818       2. Otorrhea, bilateral  H92.13       3. Tachycardia  R00.0 ND READING EKG - NO CHARGE, COMP ONLY     EKG 12 LEAD     ND ECG ROUTINE ECG W/LEAST 12 LDS W/I&R      4. Migraine syndrome  G43.909       5. BMI 27.0-27.9,adult  Z68.27         EKG shows sinus tachycardia. Tachycardia is new compared to EKG on 1/4/2018.   Amitriptyline could be the cause of tachycardia and since this has not been beneficial for migraine prophylaxis, I would recommend titrating off of medication.   Decrease amitriptyline to 25 mg for 1 week and then to 12.5 mg (split tablet in half) for 1 week and then stop medication.   Follow-up with Neurologist to discuss other medication options for migraines.  If your heart rate continues to be over 100 after you have stopped the amitriptyline, please schedule a follow-up appointment to further discuss.     Do not take ibuprofen 2 days prior to surgery and naproxen 4 days prior to surgery.   You can take tylenol if you need something for pain.     Patient is cleared for planned low risk procedure.   Electronically Signed by:   Kate Hodge PA-C  5/30/2023

## 2023-06-16 ENCOUNTER — ANESTHESIA EVENT (OUTPATIENT)
Dept: SURGERY | Facility: AMBULATORY SURGERY CENTER | Age: 23
End: 2023-06-16
Payer: COMMERCIAL

## 2023-06-19 ENCOUNTER — HOSPITAL ENCOUNTER (OUTPATIENT)
Facility: AMBULATORY SURGERY CENTER | Age: 23
Discharge: HOME | End: 2023-06-19
Attending: OTOLARYNGOLOGY
Payer: COMMERCIAL

## 2023-06-19 ENCOUNTER — ANESTHESIA (OUTPATIENT)
Dept: SURGERY | Facility: AMBULATORY SURGERY CENTER | Age: 23
End: 2023-06-19
Payer: COMMERCIAL

## 2023-06-19 VITALS
HEART RATE: 93 BPM | HEIGHT: 61 IN | TEMPERATURE: 96.9 F | SYSTOLIC BLOOD PRESSURE: 108 MMHG | BODY MASS INDEX: 28.32 KG/M2 | OXYGEN SATURATION: 99 % | DIASTOLIC BLOOD PRESSURE: 60 MMHG | WEIGHT: 150 LBS | RESPIRATION RATE: 14 BRPM

## 2023-06-19 DIAGNOSIS — H69.93 DYSFUNCTION OF BOTH EUSTACHIAN TUBES: ICD-10-CM

## 2023-06-19 DIAGNOSIS — G89.18 POSTOPERATIVE PAIN: Primary | ICD-10-CM

## 2023-06-19 LAB
HCG UR QL: NEGATIVE
INTERNAL QC OK POCT: NORMAL
POCT KIT EXPIRATION DATE: NORMAL
POCT KIT LOT NUMBER: NORMAL

## 2023-06-19 PROCEDURE — 69436 CREATE EARDRUM OPENING: CPT | Mod: 59,LT

## 2023-06-19 PROCEDURE — 69610 TYMPANIC MEMBRANE REPAIR: CPT | Mod: RT

## 2023-06-19 PROCEDURE — 69436 CREATE EARDRUM OPENING: CPT | Mod: 50 | Performed by: OTOLARYNGOLOGY

## 2023-06-19 PROCEDURE — 69610 TYMPANIC MEMBRANE REPAIR: CPT | Mod: 50 | Performed by: OTOLARYNGOLOGY

## 2023-06-19 PROCEDURE — 81025 URINE PREGNANCY TEST: CPT | Performed by: PATHOLOGY

## 2023-06-19 DEVICE — JAHN HYDROXYLVENT TUBE 1.00MM ID 9.5 MM LENGTH DENSE HYDROXYLAPATITE
Type: IMPLANTABLE DEVICE | Site: EAR | Status: FUNCTIONAL
Brand: GYRUS ACMI

## 2023-06-19 RX ORDER — OFLOXACIN 3 MG/ML
5 SOLUTION AURICULAR (OTIC) 2 TIMES DAILY
Qty: 10 ML | Refills: 0 | Status: SHIPPED | OUTPATIENT
Start: 2023-06-19 | End: 2023-07-05

## 2023-06-19 RX ORDER — ONDANSETRON 2 MG/ML
INJECTION INTRAMUSCULAR; INTRAVENOUS PRN
Status: DISCONTINUED | OUTPATIENT
Start: 2023-06-19 | End: 2023-06-19

## 2023-06-19 RX ORDER — ACETAMINOPHEN 325 MG/1
975 TABLET ORAL
Status: DISCONTINUED | OUTPATIENT
Start: 2023-06-19 | End: 2023-06-20 | Stop reason: HOSPADM

## 2023-06-19 RX ORDER — ACETAMINOPHEN 325 MG/1
975 TABLET ORAL ONCE
Status: COMPLETED | OUTPATIENT
Start: 2023-06-19 | End: 2023-06-19

## 2023-06-19 RX ORDER — HYDROCODONE BITARTRATE AND ACETAMINOPHEN 5; 325 MG/1; MG/1
1 TABLET ORAL EVERY 6 HOURS PRN
Qty: 8 TABLET | Refills: 0 | Status: SHIPPED | OUTPATIENT
Start: 2023-06-19 | End: 2023-07-20

## 2023-06-19 RX ORDER — HYDROMORPHONE HYDROCHLORIDE 1 MG/ML
0.2 INJECTION, SOLUTION INTRAMUSCULAR; INTRAVENOUS; SUBCUTANEOUS EVERY 5 MIN PRN
Status: DISCONTINUED | OUTPATIENT
Start: 2023-06-19 | End: 2023-06-19 | Stop reason: HOSPADM

## 2023-06-19 RX ORDER — ACETAMINOPHEN 325 MG/1
975 TABLET ORAL ONCE
Status: DISCONTINUED | OUTPATIENT
Start: 2023-06-19 | End: 2023-06-20 | Stop reason: HOSPADM

## 2023-06-19 RX ORDER — ONDANSETRON 4 MG/1
4 TABLET, ORALLY DISINTEGRATING ORAL EVERY 30 MIN PRN
Status: DISCONTINUED | OUTPATIENT
Start: 2023-06-19 | End: 2023-06-20 | Stop reason: HOSPADM

## 2023-06-19 RX ORDER — PROPOFOL 10 MG/ML
INJECTION, EMULSION INTRAVENOUS PRN
Status: DISCONTINUED | OUTPATIENT
Start: 2023-06-19 | End: 2023-06-19

## 2023-06-19 RX ORDER — FENTANYL CITRATE 50 UG/ML
25 INJECTION, SOLUTION INTRAMUSCULAR; INTRAVENOUS EVERY 5 MIN PRN
Status: DISCONTINUED | OUTPATIENT
Start: 2023-06-19 | End: 2023-06-19 | Stop reason: HOSPADM

## 2023-06-19 RX ORDER — FENTANYL CITRATE 50 UG/ML
INJECTION, SOLUTION INTRAMUSCULAR; INTRAVENOUS PRN
Status: DISCONTINUED | OUTPATIENT
Start: 2023-06-19 | End: 2023-06-19

## 2023-06-19 RX ORDER — SODIUM CHLORIDE, SODIUM LACTATE, POTASSIUM CHLORIDE, CALCIUM CHLORIDE 600; 310; 30; 20 MG/100ML; MG/100ML; MG/100ML; MG/100ML
INJECTION, SOLUTION INTRAVENOUS CONTINUOUS
Status: DISCONTINUED | OUTPATIENT
Start: 2023-06-19 | End: 2023-06-19 | Stop reason: HOSPADM

## 2023-06-19 RX ORDER — ONDANSETRON 4 MG/1
4 TABLET, ORALLY DISINTEGRATING ORAL EVERY 30 MIN PRN
Status: DISCONTINUED | OUTPATIENT
Start: 2023-06-19 | End: 2023-06-19 | Stop reason: HOSPADM

## 2023-06-19 RX ORDER — HYDROMORPHONE HYDROCHLORIDE 1 MG/ML
0.4 INJECTION, SOLUTION INTRAMUSCULAR; INTRAVENOUS; SUBCUTANEOUS EVERY 5 MIN PRN
Status: DISCONTINUED | OUTPATIENT
Start: 2023-06-19 | End: 2023-06-19 | Stop reason: HOSPADM

## 2023-06-19 RX ORDER — KETOROLAC TROMETHAMINE 30 MG/ML
INJECTION, SOLUTION INTRAMUSCULAR; INTRAVENOUS PRN
Status: DISCONTINUED | OUTPATIENT
Start: 2023-06-19 | End: 2023-06-19

## 2023-06-19 RX ORDER — FENTANYL CITRATE 50 UG/ML
50 INJECTION, SOLUTION INTRAMUSCULAR; INTRAVENOUS EVERY 5 MIN PRN
Status: DISCONTINUED | OUTPATIENT
Start: 2023-06-19 | End: 2023-06-19 | Stop reason: HOSPADM

## 2023-06-19 RX ORDER — ONDANSETRON 2 MG/ML
4 INJECTION INTRAMUSCULAR; INTRAVENOUS EVERY 30 MIN PRN
Status: DISCONTINUED | OUTPATIENT
Start: 2023-06-19 | End: 2023-06-20 | Stop reason: HOSPADM

## 2023-06-19 RX ORDER — ALBUTEROL SULFATE 0.83 MG/ML
2.5 SOLUTION RESPIRATORY (INHALATION) EVERY 4 HOURS PRN
Status: DISCONTINUED | OUTPATIENT
Start: 2023-06-19 | End: 2023-06-19 | Stop reason: HOSPADM

## 2023-06-19 RX ORDER — GINSENG 100 MG
CAPSULE ORAL PRN
Status: DISCONTINUED | OUTPATIENT
Start: 2023-06-19 | End: 2023-06-19 | Stop reason: HOSPADM

## 2023-06-19 RX ORDER — GLYCOPYRROLATE 0.2 MG/ML
INJECTION, SOLUTION INTRAMUSCULAR; INTRAVENOUS PRN
Status: DISCONTINUED | OUTPATIENT
Start: 2023-06-19 | End: 2023-06-19

## 2023-06-19 RX ORDER — KETOROLAC TROMETHAMINE 30 MG/ML
15 INJECTION, SOLUTION INTRAMUSCULAR; INTRAVENOUS
Status: DISCONTINUED | OUTPATIENT
Start: 2023-06-19 | End: 2023-06-19 | Stop reason: HOSPADM

## 2023-06-19 RX ORDER — SODIUM CHLORIDE, SODIUM LACTATE, POTASSIUM CHLORIDE, CALCIUM CHLORIDE 600; 310; 30; 20 MG/100ML; MG/100ML; MG/100ML; MG/100ML
INJECTION, SOLUTION INTRAVENOUS CONTINUOUS PRN
Status: DISCONTINUED | OUTPATIENT
Start: 2023-06-19 | End: 2023-06-19

## 2023-06-19 RX ORDER — DEXAMETHASONE SODIUM PHOSPHATE 10 MG/ML
10 INJECTION, SOLUTION INTRAMUSCULAR; INTRAVENOUS ONCE
Status: COMPLETED | OUTPATIENT
Start: 2023-06-19 | End: 2023-06-19

## 2023-06-19 RX ORDER — CLINDAMYCIN PHOSPHATE 900 MG/50ML
900 INJECTION, SOLUTION INTRAVENOUS SEE ADMIN INSTRUCTIONS
Status: DISCONTINUED | OUTPATIENT
Start: 2023-06-19 | End: 2023-06-20 | Stop reason: HOSPADM

## 2023-06-19 RX ORDER — ACETAMINOPHEN 325 MG/1
975 TABLET ORAL
Status: DISCONTINUED | OUTPATIENT
Start: 2023-06-19 | End: 2023-06-19 | Stop reason: HOSPADM

## 2023-06-19 RX ORDER — CLINDAMYCIN PHOSPHATE 900 MG/50ML
900 INJECTION, SOLUTION INTRAVENOUS
Status: COMPLETED | OUTPATIENT
Start: 2023-06-19 | End: 2023-06-19

## 2023-06-19 RX ORDER — SCOLOPAMINE TRANSDERMAL SYSTEM 1 MG/1
1 PATCH, EXTENDED RELEASE TRANSDERMAL
Status: DISCONTINUED | OUTPATIENT
Start: 2023-06-19 | End: 2023-06-20 | Stop reason: HOSPADM

## 2023-06-19 RX ORDER — FENTANYL CITRATE 50 UG/ML
25 INJECTION, SOLUTION INTRAMUSCULAR; INTRAVENOUS
Status: DISCONTINUED | OUTPATIENT
Start: 2023-06-19 | End: 2023-06-20 | Stop reason: HOSPADM

## 2023-06-19 RX ORDER — HYDROCODONE BITARTRATE AND ACETAMINOPHEN 5; 325 MG/1; MG/1
1 TABLET ORAL
Status: DISCONTINUED | OUTPATIENT
Start: 2023-06-19 | End: 2023-06-20 | Stop reason: HOSPADM

## 2023-06-19 RX ORDER — OXYCODONE HYDROCHLORIDE 5 MG/1
5 TABLET ORAL EVERY 4 HOURS PRN
Status: DISCONTINUED | OUTPATIENT
Start: 2023-06-19 | End: 2023-06-20 | Stop reason: HOSPADM

## 2023-06-19 RX ORDER — ACETAMINOPHEN 325 MG/1
650 TABLET ORAL
Status: DISCONTINUED | OUTPATIENT
Start: 2023-06-19 | End: 2023-06-20 | Stop reason: HOSPADM

## 2023-06-19 RX ORDER — PROPOFOL 10 MG/ML
INJECTION, EMULSION INTRAVENOUS CONTINUOUS PRN
Status: DISCONTINUED | OUTPATIENT
Start: 2023-06-19 | End: 2023-06-19

## 2023-06-19 RX ORDER — LABETALOL HYDROCHLORIDE 5 MG/ML
10 INJECTION, SOLUTION INTRAVENOUS
Status: DISCONTINUED | OUTPATIENT
Start: 2023-06-19 | End: 2023-06-19 | Stop reason: HOSPADM

## 2023-06-19 RX ORDER — ONDANSETRON 2 MG/ML
4 INJECTION INTRAMUSCULAR; INTRAVENOUS EVERY 30 MIN PRN
Status: DISCONTINUED | OUTPATIENT
Start: 2023-06-19 | End: 2023-06-19 | Stop reason: HOSPADM

## 2023-06-19 RX ORDER — LIDOCAINE HYDROCHLORIDE 20 MG/ML
INJECTION, SOLUTION INFILTRATION; PERINEURAL PRN
Status: DISCONTINUED | OUTPATIENT
Start: 2023-06-19 | End: 2023-06-19

## 2023-06-19 RX ADMIN — SCOLOPAMINE TRANSDERMAL SYSTEM 1 PATCH: 1 PATCH, EXTENDED RELEASE TRANSDERMAL at 08:19

## 2023-06-19 RX ADMIN — Medication 100 MCG: at 08:59

## 2023-06-19 RX ADMIN — Medication 100 MCG: at 09:08

## 2023-06-19 RX ADMIN — PROPOFOL 150 MCG/KG/MIN: 10 INJECTION, EMULSION INTRAVENOUS at 08:30

## 2023-06-19 RX ADMIN — GLYCOPYRROLATE 0.2 MG: 0.2 INJECTION, SOLUTION INTRAMUSCULAR; INTRAVENOUS at 08:54

## 2023-06-19 RX ADMIN — CLINDAMYCIN PHOSPHATE 900 MG: 900 INJECTION, SOLUTION INTRAVENOUS at 08:20

## 2023-06-19 RX ADMIN — Medication 100 MCG: at 09:19

## 2023-06-19 RX ADMIN — Medication 100 MCG: at 08:44

## 2023-06-19 RX ADMIN — PROPOFOL 100 MCG/KG/MIN: 10 INJECTION, EMULSION INTRAVENOUS at 09:28

## 2023-06-19 RX ADMIN — KETOROLAC TROMETHAMINE 30 MG: 30 INJECTION, SOLUTION INTRAMUSCULAR; INTRAVENOUS at 10:05

## 2023-06-19 RX ADMIN — OXYCODONE HYDROCHLORIDE 5 MG: 5 TABLET ORAL at 10:29

## 2023-06-19 RX ADMIN — PROPOFOL 160 MG: 10 INJECTION, EMULSION INTRAVENOUS at 08:30

## 2023-06-19 RX ADMIN — Medication 35 MG: at 08:31

## 2023-06-19 RX ADMIN — Medication 100 MCG: at 09:44

## 2023-06-19 RX ADMIN — Medication 100 MCG: at 08:52

## 2023-06-19 RX ADMIN — ONDANSETRON 4 MG: 2 INJECTION INTRAMUSCULAR; INTRAVENOUS at 08:48

## 2023-06-19 RX ADMIN — FENTANYL CITRATE 50 MCG: 50 INJECTION, SOLUTION INTRAMUSCULAR; INTRAVENOUS at 08:30

## 2023-06-19 RX ADMIN — SODIUM CHLORIDE, SODIUM LACTATE, POTASSIUM CHLORIDE, CALCIUM CHLORIDE: 600; 310; 30; 20 INJECTION, SOLUTION INTRAVENOUS at 08:20

## 2023-06-19 RX ADMIN — Medication 100 MCG: at 08:57

## 2023-06-19 RX ADMIN — LIDOCAINE HYDROCHLORIDE 50 MG: 20 INJECTION, SOLUTION INFILTRATION; PERINEURAL at 08:30

## 2023-06-19 RX ADMIN — ACETAMINOPHEN 975 MG: 325 TABLET ORAL at 07:41

## 2023-06-19 RX ADMIN — DEXAMETHASONE SODIUM PHOSPHATE 10 MG: 10 INJECTION, SOLUTION INTRAMUSCULAR; INTRAVENOUS at 08:42

## 2023-06-19 RX ADMIN — FENTANYL CITRATE 50 MCG: 50 INJECTION, SOLUTION INTRAMUSCULAR; INTRAVENOUS at 09:36

## 2023-06-19 NOTE — BRIEF OP NOTE
[facility] [place]    Brief Operative Note    Pre-operative diagnosis: Dysfunction of both eustachian tubes [H69.83]  Post-operative diagnosis Same as pre-operative diagnosis    Procedure: Procedure(s):  INSERTION, CLARITZA VENTILATING TUBE, MIDDLE EAR  Surgeon: Surgeon(s) and Role:     * Lily Dong MD - Primary  Anesthesia: General   Estimated Blood Loss: Minimal    Drains: None  Specimens: * No specimens in log *  Findings:   Right ear with superior anterior perforation (about 20%) with gelfoam myringoplasty. Left ear with small posterior perforation near the margin with gelfoam myringoplasty. Claritza tube placed bilaterally (lot number below).  Complications: None.  Implants:   Implant Name Type Inv. Item Serial No.  Lot No. LRB No. Used Action   TUBE EAR VENT CLARITZA 10X9.5.MM [id number] - [id number] Cochlear/Ear Implant TUBE EAR VENT CLARITZA 10X9.5.MM [id number]  OLYMPUS VITO [id number] Right 1 Implanted   TUBE EAR VENT CLARITZA 10X9.5.MM [id number] - [id number] Cochlear/Ear Implant TUBE EAR VENT CLARITZA 10X9.5.MM [id number]  OLYMPUS VITO [id number] Left 1 Implanted

## 2023-06-19 NOTE — OP NOTE
Date of service:  6/19/23    Preoperative diagnosis:  eustachian tube dysfunction     Postoperative diagnosis:  eustachian tube dysfunction     Procedure:  1.  Bilateral Mayo tube placement  2.  Bilateral gelfoam myringoplasty  3.  Facial nerve monitoring x 1 hour    Surgeon:  Lily Dong MD    Resident:  Zully Rodriguez MD    Anesthesia:  General    EBL:  2cc    Specimens:  None    Complications:  None    Findings:  Right anterior superior perforation of approximately 20%, left posterior perforation of approximately 5%, middle ears clear.    Indications:  Migdalia Hayes is a patient with a bilateral eustachian tube dysfunction with bilateral extruded t-tubes.  Discussion was had about Mayo tube placement.  Risks and benefits had been discussed and consent had been obtained.    Procedure:  The patient was taken to the operating room and placed supine on the operating room table.  General anesthesia was induced and endotracheal intubation was performed.  Time Out was then performed with confirmation of the patient, site and procedure.  Facial nerve electrodes were placed on the both sides of the face.  Impedances were checked and the nerve was monitored for the entirety of the case.  1:100,000 epinephrine was injected into the ear canals bilaterally.      The right ear was turned up. The area was prepped and draped in standard surgical fashion.  The operating microscope was brought into position and used for the entirety of the case.  The ear canal was inspected and irrigated with normal saline.  The tympanic membrane was inspected.  There is noted to be an anterior superior perforation with healing epithelial edges anterior and inferior of approximately 15%. The edges were rimmed resulting a 20% perforation. Gelfoam myringoplasty was performed to fill the perforation. Canal incisions were made and the tympanomeatal flap elevated. The middle ear was entered inferiorly and the TM was elevated superiorly. The middle ear is  noted to be clear. There is very little posterior inferior bony ear canal overhang. A trough was drilled in the posterior inferior ear canal. A Mayo tube was placed into the middle ear. The tympanomeatal flap was placed back into position and gelfoam used to cover the flap and seal the incision.  Bacitracin was used to fill the ear canal.     The left ear was then turned up. The area was prepped and draped in standard surgical fashion.  The operating microscope was brought into position and used for the entirety of the case.  The ear canal was inspected and irrigated with normal saline.  The tympanic membrane was inspected.  There is a small approximately 5% posterior perforation with ragged edges. The edges were slightly rimmed and a piece of gelfoam placed into the perforation. Canal incisions were made and the tympanomeatal flap was elevated. The middle ear was entered inferiorly and the TM lifted superiorly. The middle ear is noted to be clear. There is almost no bony posterior inferior ear canal overhang over the hypotympanum. A trough was drilled into the posterior inferior ear canal.  The tympanomeatal flap was placed back into position and gelfoam used to cover the flap and seal the incision.  Bacitracin was used to fill the ear canal. The surgical sites were cleaned. Cotton balls were placed over the ear canals and the facial nerve electrodes removed.  The patient tolerated the procedure well and counts were correct.  The patient was returned to Anesthesia, awakened, extubated and taken to the PACU in stable condition.

## 2023-06-19 NOTE — DISCHARGE INSTRUCTIONS
"1. Resume your home medications. Take pain medication, Tylenol or ibuprofen as needed. Use docusate to avoid constipation. Start your ear drops in 1 week after surgery and use until your follow up.    2. Wound care  * Change the cotton ball in your ear as needed for drainage.  It's normal to expect some drainage from your ear for the first few days after surgery.    3. Use a cotton ball coated with vasoline to keep water out of ear canal.    4. For the next week, no heavy lifting more than 5 pounds, no strenuous activities. No nose blowing. Sneeze with your mouth open.    5. Please call MD or come to the ED for shortness of breath, trouble breathing, inability to tolerate liquids, intractable dizziness, or signs of infection such as fevers or redness.      Call the [phone] clinic number if you have any questions and concerns during the day and call [phone] at night and ask for \"ENT resident on call\".     6. Follow up with Dr. Dong as previously scheduled.    Scopolamine Patch- (Absorbed through the skin)    This medicine prevents nausea and vomiting caused by motion sickness or anesthesia.  The medicine is in a patch worn behind the ear.      Do NOT use the Scopolamine Patch if you have glaucoma or are allergic to scopolamine.    How to Use This Medicine:  The patch is applied behind the ear.  Keep the patch dry to prevent it from falling off.  Limit contact with water (no bathing or swimming).    If the patch is loose or falls off throw it away.  You do not need to apply a new patch.  After you take off the patch or if it falls off, wash your hands and the area behind your ear with soap and water.    You can remove the patch tomorrow, or leave on for up to 3 days.  Only one patch should be used at any time.    How to Dispose of This Medicine:  Fold the used patch in half with the sticky sides together. Throw any used patch away so that children or pets cannot get to it. You will also need to throw away " old patches after the expiration date has passed.  Keep all medicine away from children and never share your medicine with anyone.    Warnings While Using This Medicine:  This medicine can make you sleepy.  Avoid taking sleeping pills and other medicines that can make you sleepy while the patch is on.  Do not drink alcohol while the patch is on.  This medicine can cause temporary blurring and other vision problems if it comes in contact with the eyes.  This is not serious unless accompanied by eye pain and redness.   This medicine may cause problems with urination. If you have problems with urinating, remove the patch.  If you are unable to urinate, call your doctor.    This medicine may make you dizzy or drowsy. Avoid driving, using machines, or doing anything else that could be dangerous if the patch is on.  This medicine may make you sweat less and cause your body to get too hot. Be careful in hot weather or if you are exercising.  Make sure any doctor or dentist who treats you knows that you have the patch on. This medicine may affect the results of certain medical tests.  Skin burns have been reported at the patch site in several patients wearing an aluminized transdermal system during a magnetic resonance imaging scan (MRI).  Since this patch contains aluminum, it is recommended to remove the patch if you are having an MRI.    Possible Side Effects While Using This Medicine:  Dry mouth  Drowsiness  Temporary blurring of vision and widening of the pupils    Call your doctor right away if you notice any of these side effects:  Allergic reaction: Itching or hives, swelling in your face or hands, swelling or tingling in your mouth or throat, chest tightness, trouble breathing.  Blurred vision that does not go away after the patch is removed  Confusion or memory loss  Fast,slow, or uneven heartbeat  Lightheadedness, dizziness, drowsiness, or fainting  Seeing, hearing, or feeling things that are not  there  Restlessness  Severe eye pain  Trouble urinating    If you notice other side effects that you think are caused by this medicine, call your doctor immediately.  [facility]  Home Care Following Anesthesia  For 24 hours after surgery:  Get plenty of rest.  A responsible adult must stay with you for at least 24 hours after you leave the surgery center.  Do not drive or use heavy equipment.  If you have weakness or tingling, don't drive or use heavy equipment until this feeling goes away.   Do not drink alcohol.   Avoid strenuous or risky activities.  Ask for help when climbing stairs.  You may feel lightheaded.  IF so, sit for a few minutes before standing.  Have someone help you get up.   If you have nausea (feel sick to your stomach): Drink only clear liquids such as apple juice, ginger ale, broth or 7-Up.  Rest may also help.  Be sure to drink enough fluids.  Move to a regular diet as you feel able.   You may have a slight fever.  Call the doctor if your fever is over 100 F (37.7 C) (taken under the tongue) or lasts longer than 24 hours.  You may have a dry mouth, a sore throat, muscle aches or trouble sleeping. These should go away after 24 hours.  Do not make important or legal decisions.   It is recommended to avoid smoking.               Tips for taking pain medications  To get the best pain relief possible, remember these points:  Take pain medications as directed, before pain becomes severe.  Pain medication can upset your stomach: taking it with food may help.  Constipation is a common side effect of pain medication. Drink plenty of  fluids.  Eat foods high in fiber. Take a stool softener if recommended by your doctor or pharmacist.  Do not drink alcohol, drive or operate machinery while taking pain medications.  Ask about other ways to control pain, such as with heat, ice or relaxation.    Tylenol/Acetaminophen Consumption  To help encourage the safe use of  acetaminophen, the makers of TYLENOL  have lowered the maximum daily dose for single-ingredient Extra Strength TYLENOL  (acetaminophen) products sold in the [place] from 8 pills per day (4,000 mg) to 6 pills per day (3,000 mg). The dosing interval has also changed from 2 pills every 4-6 hours to 2 pills every 6 hours.  If you feel your pain relief is insufficient, you may take Tylenol/Acetaminophen in addition to your narcotic pain medication.   Be careful not to exceed 3,000 mg of Tylenol/Acetaminophen in a 24 hour period from all sources.  If you are taking extra strength Tylenol/acetaminophen (500 mg), the maximum dose is 6 tablets in 24 hours.  If you are taking regular strength acetaminophen (325 mg), the maximum dose is 9 tablets in 24 hours.    Call a doctor for any of the following:  Signs of infection (fever, growing tenderness at the surgery site, a large amount of drainage or bleeding, severe pain, foul-smelling drainage, redness, swelling).  It has been over 8 to 10 hours since surgery and you are still not able to urinate (pass water).  Headache for over 24 hours.  Numbness, tingling or weakness the day after surgery (if you had spinal anesthesia).  Signs of Covid-19 infection (temperature over 100 degrees, shortness of breath, cough, loss of taste/smell, generalized body aches, persistent headache, chills, sore throat, nausea/vomiting/diarrhea)  Your doctor is:  Dr. Lily Dong, ENT Otolaryngology: [phone]                    Or dial [phone] and ask for the resident on call for:  ENT Otolaryngology  For emergency care, call the:  [facility]:  [phone] (TTY for hearing impaired: [phone])

## 2023-06-19 NOTE — INTERVAL H&P NOTE
"I have reviewed the surgical (or preoperative) H&P that is linked to this encounter, and examined the patient. There are no significant changes    Clinical Conditions Present on Arrival:  Clinically Significant Risk Factors Present on Admission                  # Overweight: Estimated body mass index is 28.34 kg/m  as calculated from the following:    Height as of this encounter: 1.549 m (5' 1\").    Weight as of this encounter: 68 kg (150 lb).       "

## 2023-06-19 NOTE — ANESTHESIA POSTPROCEDURE EVALUATION
Patient: Migdalia Hayes    Procedure: Procedure(s):  INSERTION, CLARITZA VENTILATING TUBE, MIDDLE EAR       Anesthesia Type:  General    Note:  Disposition: Outpatient   Postop Pain Control: Uneventful            Sign Out: Well controlled pain   PONV: No   Neuro/Psych: Uneventful            Sign Out: Acceptable/Baseline neuro status   Airway/Respiratory: Uneventful            Sign Out: Acceptable/Baseline resp. status   CV/Hemodynamics: Uneventful            Sign Out: Acceptable CV status; No obvious hypovolemia; No obvious fluid overload   Other NRE: NONE   DID A NON-ROUTINE EVENT OCCUR? No           Last vitals:  Vitals Value Taken Time   /76 06/19/23 1030   Temp 35.9  C (96.7  F) 06/19/23 1030   Pulse 106 06/19/23 1034   Resp 12 06/19/23 1033   SpO2 99 % 06/19/23 1034   Vitals shown include unvalidated device data.    Electronically Signed By: Dioni Agarwal MD  June 19, 2023  11:42 AM

## 2023-06-19 NOTE — ANESTHESIA CARE TRANSFER NOTE
Patient: Migdalia Hayes    Procedure: Procedure(s):  INSERTION, CLARITZA VENTILATING TUBE, MIDDLE EAR       Diagnosis: Dysfunction of both eustachian tubes [H69.83]  Diagnosis Additional Information: No value filed.    Anesthesia Type:   No value filed.     Note:    Oropharynx: oropharynx clear of all foreign objects and spontaneously breathing  Level of Consciousness: awake  Oxygen Supplementation: face mask      Dentition: dentition unchanged  Vital Signs Stable: post-procedure vital signs reviewed and stable  Report to RN Given: handoff report given  Patient transferred to: PACU    Handoff Report: Identifed the Patient, Identified the Reponsible Provider, Reviewed the pertinent medical history, Discussed the surgical course, Reviewed Intra-OP anesthesia mangement and issues during anesthesia, Set expectations for post-procedure period and Allowed opportunity for questions and acknowledgement of understanding      Vitals:  Vitals Value Taken Time   /54 06/19/23 1006   Temp 35.8  C (96.4  F) 06/19/23 1006   Pulse 104 06/19/23 1008   Resp 14 06/19/23 1008   SpO2 100 % 06/19/23 1008   Vitals shown include unvalidated device data.    Electronically Signed By: BK Clement CRNA  June 19, 2023  10:10 AM

## 2023-06-19 NOTE — ANESTHESIA PREPROCEDURE EVALUATION
"Anesthesia Pre-Procedure Evaluation    Patient: Migdalia Hayes   MRN: [id number] : 2000        Procedure : Procedure(s):  INSERTION, CLARITZA VENTILATING TUBE, MIDDLE EAR          Past Medical History:   Diagnosis Date     Hearing loss      PONV (postoperative nausea and vomiting)       Past Surgical History:   Procedure Laterality Date     Adenoids removed       ENT SURGERY      ear tubes 4 x      MYRINGOTOMY, INSERT TUBE BILATERAL, COMBINED Bilateral 2014    Procedure: COMBINED MYRINGOTOMY, INSERT TUBE BILATERAL;  Surgeon: Lily Dong MD;  Location: [facility]     MYRINGOTOMY, INSERT TUBE BILATERAL, COMBINED Bilateral 2018    Procedure: COMBINED MYRINGOTOMY, INSERT TUBE BILATERAL;  Left Myringotomy with T Tube Placement, Right T Tube Removal and Replacement;  Surgeon: Lily Dong MD;  Location: [facility]     REMOVE TUBE, MYRINGOTOMY, COMBINED Right 2018    Procedure: COMBINED REMOVE TUBE, MYRINGOTOMY;;  Surgeon: Lily Dnog MD;  Location: [facility]      Allergies   Allergen Reactions     Cefdinir Hives     Cephalexin Hives     Polymyxin B-Trimethoprim Swelling     Eye gtts given for \"pinkeye\"  Eye lid swollen     Zithromax [Azithromycin] Hives      Social History     Tobacco Use     Smoking status: Never     Smokeless tobacco: Never   Vaping Use     Vaping status: Not on file   Substance Use Topics     Alcohol use: No      Wt Readings from Last 1 Encounters:   23 68 kg (150 lb)        Anesthesia Evaluation   Pt has had prior anesthetic.     History of anesthetic complications  - PONV.      ROS/MED HX  ENT/Pulmonary:       Neurologic:       Cardiovascular:       METS/Exercise Tolerance:     Hematologic:       Musculoskeletal:       GI/Hepatic:       Renal/Genitourinary:       Endo:       Psychiatric/Substance Use:       Infectious Disease:       Malignancy:       Other: Comment: Dysfunction in both eustachian tubes           Physical Exam    Airway  airway exam normal           Respiratory " Devices and Support         Dental       (+) Completely normal teeth      Cardiovascular   cardiovascular exam normal          Pulmonary   pulmonary exam normal                OUTSIDE LABS:  CBC: No results found for: WBC, HGB, HCT, PLT  BMP: No results found for: NA, POTASSIUM, CHLORIDE, CO2, BUN, CR, GLC  COAGS: No results found for: PTT, INR, FIBR  POC:   Lab Results   Component Value Date    HCG Negative 06/19/2023     HEPATIC: No results found for: ALBUMIN, PROTTOTAL, ALT, AST, GGT, ALKPHOS, BILITOTAL, BILIDIRECT, YO  OTHER: No results found for: PH, LACT, A1C, BURT, PHOS, MAG, LIPASE, AMYLASE, TSH, T4, T3, CRP, SED    Anesthesia Plan    ASA Status:  2   NPO Status:  NPO Appropriate    Anesthesia Type: General.     - Airway: ETT   Induction: Intravenous.   Maintenance: Balanced.        Consents    Anesthesia Plan(s) and associated risks, benefits, and realistic alternatives discussed. Questions answered and patient/representative(s) expressed understanding.    - Discussed:     - Discussed with:  Patient      - Extended Intubation/Ventilatory Support Discussed: No.      - Patient is DNR/DNI Status: No    Use of blood products discussed: No .     Postoperative Care    Pain management: Multi-modal analgesia, IV analgesics.   PONV prophylaxis: Scopolamine patch, Dexamethasone or Solumedrol, Ondansetron (or other 5HT-3)     Comments:                Dioni Agarwal MD

## 2023-07-03 ENCOUNTER — MYC REFILL (OUTPATIENT)
Dept: SURGERY | Facility: AMBULATORY SURGERY CENTER | Age: 23
End: 2023-07-03
Payer: COMMERCIAL

## 2023-07-03 DIAGNOSIS — H69.93 DYSFUNCTION OF BOTH EUSTACHIAN TUBES: ICD-10-CM

## 2023-07-03 RX ORDER — OFLOXACIN 3 MG/ML
5 SOLUTION AURICULAR (OTIC) 2 TIMES DAILY
Qty: 10 ML | Refills: 0 | Status: CANCELLED | OUTPATIENT
Start: 2023-07-03

## 2023-07-05 DIAGNOSIS — H69.93 DYSFUNCTION OF BOTH EUSTACHIAN TUBES: ICD-10-CM

## 2023-07-05 RX ORDER — OFLOXACIN 3 MG/ML
5 SOLUTION AURICULAR (OTIC) 2 TIMES DAILY
Qty: 10 ML | Refills: 0 | Status: CANCELLED | OUTPATIENT
Start: 2023-07-05

## 2023-07-05 RX ORDER — OFLOXACIN 3 MG/ML
5 SOLUTION AURICULAR (OTIC) 2 TIMES DAILY
Qty: 10 ML | Refills: 0 | Status: SHIPPED | OUTPATIENT
Start: 2023-07-05 | End: 2023-07-10

## 2023-07-10 ENCOUNTER — TELEPHONE (OUTPATIENT)
Dept: OTOLARYNGOLOGY | Facility: CLINIC | Age: 23
End: 2023-07-10
Payer: COMMERCIAL

## 2023-07-10 DIAGNOSIS — H69.93 DYSFUNCTION OF BOTH EUSTACHIAN TUBES: ICD-10-CM

## 2023-07-10 RX ORDER — OFLOXACIN 3 MG/ML
5 SOLUTION AURICULAR (OTIC) 2 TIMES DAILY
Qty: 10 ML | Refills: 0 | Status: SHIPPED | OUTPATIENT
Start: 2023-07-10 | End: 2023-07-20

## 2023-07-10 NOTE — TELEPHONE ENCOUNTER
[facility] Call Center    Phone Message    May a detailed message be left on voicemail: yes     Reason for Call: Medication Question or concern regarding medication   Prescription Clarification  Name of Medication: Floxin  Prescribing Provider: Dr. Dong   Pharmacy: [facility] in [place]     What on the order needs clarification? The pt called today asking about the refill of ofloxacin (FLOXIN) 0.3 % otic solution. The pt wasn't notified that Dr. Dong sent in a RX for the ofloxacin (FLOXIN) 0.3 % otic solution to the [place] pharmacy. So the pt has been out for days. The pt is home now so needs the RX to be sent to the [facility] pharmacy in [place]. Please call or send a My Chart message to the pt to let her know the status. Thanks.           Action Taken: Message routed to:  [facility] ENT    Travel Screening: Not Applicable

## 2023-07-20 ENCOUNTER — OFFICE VISIT (OUTPATIENT)
Dept: OTOLARYNGOLOGY | Facility: CLINIC | Age: 23
End: 2023-07-20
Payer: COMMERCIAL

## 2023-07-20 VITALS
HEART RATE: 87 BPM | DIASTOLIC BLOOD PRESSURE: 69 MMHG | TEMPERATURE: 97.2 F | WEIGHT: 143 LBS | SYSTOLIC BLOOD PRESSURE: 100 MMHG | HEIGHT: 61 IN | OXYGEN SATURATION: 98 % | BODY MASS INDEX: 27 KG/M2

## 2023-07-20 DIAGNOSIS — T85.898A OBSTRUCTION OF PRESSURE EQUALIZATION TUBE, INITIAL ENCOUNTER: ICD-10-CM

## 2023-07-20 DIAGNOSIS — H69.93 DYSFUNCTION OF BOTH EUSTACHIAN TUBES: Primary | ICD-10-CM

## 2023-07-20 PROCEDURE — 99024 POSTOP FOLLOW-UP VISIT: CPT | Performed by: OTOLARYNGOLOGY

## 2023-07-20 RX ORDER — MONTELUKAST SODIUM 10 MG/1
1 TABLET ORAL AT BEDTIME
COMMUNITY
Start: 2023-06-30

## 2023-07-20 RX ORDER — TOPIRAMATE 25 MG/1
1 TABLET, FILM COATED ORAL
COMMUNITY
Start: 2023-06-06

## 2023-07-20 RX ORDER — METHYLPREDNISOLONE 4 MG
TABLET, DOSE PACK ORAL
Qty: 21 TABLET | Refills: 0 | Status: SHIPPED | OUTPATIENT
Start: 2023-07-20

## 2023-07-20 RX ORDER — CIPROFLOXACIN AND DEXAMETHASONE 3; 1 MG/ML; MG/ML
5 SUSPENSION/ DROPS AURICULAR (OTIC) 2 TIMES DAILY
Qty: 7.5 ML | Refills: 1 | Status: SHIPPED | OUTPATIENT
Start: 2023-07-20

## 2023-07-20 ASSESSMENT — PAIN SCALES - GENERAL: PAINLEVEL: EXTREME PAIN (8)

## 2023-07-20 NOTE — PATIENT INSTRUCTIONS
You were seen in the ENT Clinic today by Dr. Dong. If you have any questions or concerns after your appointment, please contact us (see below)      2.   Please return to clinic in one month.   3.   Medication has been sent to your pharmacy, please use as directed.         How to Contact Us:  Send a [employer] message to your provider. Our team will respond to you via [employer]. Occasionally, we will need to call you to get further information.  For urgent matters (Monday-Friday), call the ENT Clinic: [phone] and speak with a call center team member - they will route your call appropriately.   If you'd like to speak directly with a nurse, please find our contact information below. We do our best to check voicemail frequently throughout the day, and will work to call you back within 1-2 days. For urgent matters, please use the general clinic phone numbers listed above.      Ely CRUZ RN  ENT RN Care Coordinator  Direct: [phone]    Marianne VERGARA LPN  Direct: [phone]

## 2023-07-20 NOTE — NURSING NOTE
"Chief Complaint   Patient presents with     RECHECK     Post op      Blood pressure 100/69, pulse 87, temperature 97.2  F (36.2  C), height 1.549 m (5' 1\"), weight 64.9 kg (143 lb), SpO2 98 %.    Vern Mariscal LPN    "

## 2023-07-20 NOTE — Clinical Note
7/20/2023       RE: Migdalia Hayes  [address] [place] [id number]     Dear Colleague,    Thank you for referring your patient, Migdalia Hayes, to the [facility] [place] at [facility]. Please see a copy of my visit note below.    No notes on file    Again, thank you for allowing me to participate in the care of your patient.      Sincerely,    Lily Dong MD

## 2023-08-16 ENCOUNTER — OFFICE VISIT (OUTPATIENT)
Dept: OTOLARYNGOLOGY | Facility: CLINIC | Age: 23
End: 2023-08-16
Payer: COMMERCIAL

## 2023-08-16 VITALS
HEIGHT: 61 IN | DIASTOLIC BLOOD PRESSURE: 68 MMHG | OXYGEN SATURATION: 98 % | TEMPERATURE: 97.7 F | BODY MASS INDEX: 27 KG/M2 | SYSTOLIC BLOOD PRESSURE: 115 MMHG | HEART RATE: 95 BPM | WEIGHT: 143 LBS

## 2023-08-16 DIAGNOSIS — H69.93 DYSFUNCTION OF BOTH EUSTACHIAN TUBES: Primary | ICD-10-CM

## 2023-08-16 PROCEDURE — 99212 OFFICE O/P EST SF 10 MIN: CPT | Performed by: OTOLARYNGOLOGY

## 2023-08-16 RX ORDER — TIMOLOL MALEATE 5 MG/ML
1 SOLUTION/ DROPS OPHTHALMIC
COMMUNITY
Start: 2023-01-20

## 2023-08-16 RX ORDER — RIZATRIPTAN BENZOATE 10 MG/1
10 TABLET, ORALLY DISINTEGRATING ORAL
COMMUNITY
Start: 2023-06-05

## 2023-08-16 RX ORDER — BUDESONIDE AND FORMOTEROL FUMARATE DIHYDRATE 80; 4.5 UG/1; UG/1
2 AEROSOL RESPIRATORY (INHALATION) 2 TIMES DAILY
COMMUNITY

## 2023-08-16 ASSESSMENT — PAIN SCALES - GENERAL: PAINLEVEL: NO PAIN (0)

## 2023-08-16 NOTE — PROGRESS NOTES
Migdalia Hayes is seen for her second postoperative check after bilateral Mayo tube placement. She reports her ears feel much better than last time and her hearing is good with no fullness on either side. No pain or drainage.    Physical examination:  female in no acute distress.  Alert and answering questions appropriately.  HB 1/6 bilaterally.  Both ears examined under the microscope. Left ear canal clear, Mayo tube appears to be mostly extruded and sitting on top of the TM although the lumen of the tube may still be under the TM and open to the middle ear, middle ear aerated without effusion, TM still pexied onto the end of the long process of the incus which is stable, no retraction pockets. Right ear canal clear, Mayo tube in position and open, middle ear aerated with a stable epitympanic retraction pocket, no effusion.    Assessment and plan:  Healing well with aerated middle ears bilaterally. The left Mayo tube looks like it may be extruding but the lumen remains within the middle ear. We'll see her back in 6 weeks with an audiogram.

## 2023-08-16 NOTE — NURSING NOTE
"Chief Complaint   Patient presents with    RECHECK     1 month follow up     Blood pressure 115/68, pulse 95, temperature 97.7  F (36.5  C), height 1.549 m (5' 1\"), weight 64.9 kg (143 lb), SpO2 98 %.  Vern Mariscal LPN    "

## 2023-08-16 NOTE — LETTER
8/16/2023       RE: Migdalia Hayes  [address] [place] [id number]     Dear Colleague,    Thank you for referring your patient, Migdalia Hayes, to the [facility] [place] at . Please see a copy of my visit note below.    Migdalia Hayes is seen for her second postoperative check after bilateral Mayo tube placement. She reports her ears feel much better than last time and her hearing is good with no fullness on either side. No pain or drainage.    Physical examination:  female in no acute distress.  Alert and answering questions appropriately.  HB 1/6 bilaterally.  Both ears examined under the microscope. Left ear canal clear, Mayo tube appears to be mostly extruded and sitting on top of the TM although the lumen of the tube may still be under the TM and open to the middle ear, middle ear aerated without effusion, TM still pexied onto the end of the long process of the incus which is stable, no retraction pockets. Right ear canal clear, Mayo tube in position and open, middle ear aerated with a stable epitympanic retraction pocket, no effusion.    Assessment and plan:  Healing well with aerated middle ears bilaterally. The left Mayo tube looks like it may be extruding but the lumen remains within the middle ear. We'll see her back in 6 weeks with an audiogram.      Again, thank you for allowing me to participate in the care of your patient.      Sincerely,    Lily Dong MD

## 2023-08-16 NOTE — PATIENT INSTRUCTIONS
1. You were seen in the ENT Clinic today by Dr. Dong.  If you have any questions or concerns after your appointment, please call   - Option 1: ENT Clinic: [phone]   - Option 2: Marianne (Dr. Dong's Nurse): [phone]          Ely (Dr. Dong's Nurse): [phone]     2.   Plan to return to clinic in 6 weeks with hearing test    How to Contact Us:  Send a [employer] message to your provider. Our team will respond to you via [employer]. Occasionally, we will need to call you to get further information.  For urgent matters (Monday-Friday), call the ENT Clinic: [phone] and speak with a call center team member - they will route your call appropriately.   If you'd like to speak directly with a nurse, please find our contact information below. We do our best to check voicemail frequently throughout the day, and will work to call you back within 1-2 days. For urgent matters, please use the general clinic phone numbers listed above.        Marianne BLACK LPN  [facility] - Otolaryngology

## 2023-08-20 NOTE — PROGRESS NOTES
Migdalia Hayes is seen for her first postoperative check after bilateral Mayo tube placement and gelfoam myringoplasties. She reports her left ear is quite plugged and her hearing is down, right ear is only slightly plugged. No drainage from either side.    Physical examination:  female in no acute distress.  Alert and answering questions appropriately.  HB 1/6 bilaterally.  Both ears examined under the microscope. Right ear canal with a little gelfoam and Bacitracin around the Mayo tube, this was removed, the Mayo tube appears to be in position with some edema of the ear canal skin, TM appears intact and retracted. Left ear canal with gelfoam and clot which was removed, there is quite a bit of granulation tissue and edema around the shaft and it is impossible to see to the base, TM retracted.    Assessment and plan:  Granulation tissue around the left Mayo tube. We will change her to Ciprodex drops as well as place her on a Medrol dose pack and see her back in a month.

## 2023-11-29 ENCOUNTER — TELEPHONE (OUTPATIENT)
Dept: OTOLARYNGOLOGY | Facility: CLINIC | Age: 23
End: 2023-11-29
Payer: COMMERCIAL

## 2023-11-30 ENCOUNTER — TELEPHONE (OUTPATIENT)
Dept: OTOLARYNGOLOGY | Facility: CLINIC | Age: 23
End: 2023-11-30
Payer: COMMERCIAL

## 2023-12-01 ENCOUNTER — TELEPHONE (OUTPATIENT)
Dept: OTOLARYNGOLOGY | Facility: CLINIC | Age: 23
End: 2023-12-01
Payer: COMMERCIAL

## 2024-06-29 ENCOUNTER — HEALTH MAINTENANCE LETTER (OUTPATIENT)
Age: 24
End: 2024-06-29

## 2025-07-13 ENCOUNTER — HEALTH MAINTENANCE LETTER (OUTPATIENT)
Age: 25
End: 2025-07-13

## (undated) DEVICE — NIM ELEC SUBDERMAL NDL 3PAIR/BOX

## (undated) DEVICE — BLADE KNIFE BEAVER MINI BEAVER6400

## (undated) DEVICE — DRAPE MICROSCOPE LEICA 54X150" AR8033650

## (undated) DEVICE — DRSG BANDAID 1X3" FABRIC CURITY LATEX FREE KC44101

## (undated) DEVICE — SU CHROMIC 5-0 P-3 18" 687G

## (undated) DEVICE — PREP POVIDONE IODINE SOLUTION 10% 4OZ BOTTLE 29906-004

## (undated) DEVICE — ESU GROUND PAD ADULT W/CORD E7507

## (undated) DEVICE — SYR 10ML LL W/O NDL

## (undated) DEVICE — WIPE INSTRUMENT MEROCEL 400200

## (undated) DEVICE — PREP POVIDONE-IODINE 7.5% SCRUB 4OZ BOTTLE MDS093945

## (undated) DEVICE — POSITIONER HEAD DONUT FOAM 9" LF FP-HEAD9

## (undated) DEVICE — PREP SKIN SCRUB TRAY 4461A

## (undated) DEVICE — SPONGE SURGIFOAM 100 1974

## (undated) DEVICE — SOL NACL 0.9% IRRIG 1000ML BOTTLE 2F7124

## (undated) DEVICE — GLOVE PROTEXIS POWDER FREE SMT 6.5  2D72PT65X

## (undated) DEVICE — PACK EAR CUSTOM ASC

## (undated) DEVICE — SOL WATER IRRIG 500ML BOTTLE 2F7113

## (undated) DEVICE — PACK PEDS MYRINGOTOMY CUSTOM SEN15PMRM2

## (undated) DEVICE — BLADE KNIFE BEAVER MYRINGOTOMY 7121

## (undated) DEVICE — LINEN TOWEL PACK X5 5464

## (undated) DEVICE — TUBE EAR GOODE T SIL 10-16010

## (undated) DEVICE — SOL WATER IRRIG 1000ML BOTTLE 2F7114

## (undated) DEVICE — BLADE KNIFE BEAVER TYMPANOPLASTY 2.5MM W/60D DOWN 377200

## (undated) DEVICE — DRAPE EAR CHRONIC LATEX FREE 3609

## (undated) DEVICE — GLOVE PROTEXIS W/NEU-THERA 6.5  2D73TE65

## (undated) DEVICE — SOL NACL 0.9% IRRIG 500ML BOTTLE 2F7123

## (undated) DEVICE — SUCTION MANIFOLD NEPTUNE 2 SYS 4 PORT 0702-020-000

## (undated) DEVICE — SUCTION MANIFOLD DORNOCH ULTRA CART UL-CL500

## (undated) DEVICE — DRSG COTTON BALL 6PK LCB62

## (undated) DEVICE — ESU ELEC BLADE 2.75" COATED/INSULATED E1455

## (undated) DEVICE — POSITIONER ARMBOARD FOAM 1PAIR LF FP-ARMB1

## (undated) RX ORDER — ONDANSETRON 2 MG/ML
INJECTION INTRAMUSCULAR; INTRAVENOUS
Status: DISPENSED
Start: 2023-06-19

## (undated) RX ORDER — PROPOFOL 10 MG/ML
INJECTION, EMULSION INTRAVENOUS
Status: DISPENSED
Start: 2023-06-19

## (undated) RX ORDER — CLINDAMYCIN PHOSPHATE 900 MG/50ML
INJECTION, SOLUTION INTRAVENOUS
Status: DISPENSED
Start: 2023-06-19

## (undated) RX ORDER — ACETAMINOPHEN 325 MG/1
TABLET ORAL
Status: DISPENSED
Start: 2023-06-19

## (undated) RX ORDER — PROPOFOL 10 MG/ML
INJECTION, EMULSION INTRAVENOUS
Status: DISPENSED
Start: 2018-09-21

## (undated) RX ORDER — OFLOXACIN 3 MG/ML
SOLUTION AURICULAR (OTIC)
Status: DISPENSED
Start: 2018-09-21

## (undated) RX ORDER — DEXAMETHASONE SODIUM PHOSPHATE 10 MG/ML
INJECTION, SOLUTION INTRAMUSCULAR; INTRAVENOUS
Status: DISPENSED
Start: 2023-06-19

## (undated) RX ORDER — IBUPROFEN 600 MG/1
TABLET, FILM COATED ORAL
Status: DISPENSED
Start: 2018-09-21

## (undated) RX ORDER — GLYCOPYRROLATE 0.2 MG/ML
INJECTION INTRAMUSCULAR; INTRAVENOUS
Status: DISPENSED
Start: 2023-06-19

## (undated) RX ORDER — FENTANYL CITRATE 50 UG/ML
INJECTION, SOLUTION INTRAMUSCULAR; INTRAVENOUS
Status: DISPENSED
Start: 2023-06-19

## (undated) RX ORDER — FENTANYL CITRATE-0.9 % NACL/PF 10 MCG/ML
PLASTIC BAG, INJECTION (ML) INTRAVENOUS
Status: DISPENSED
Start: 2023-06-19

## (undated) RX ORDER — REMIFENTANIL HYDROCHLORIDE 1 MG/ML
INJECTION, POWDER, LYOPHILIZED, FOR SOLUTION INTRAVENOUS
Status: DISPENSED
Start: 2023-06-19

## (undated) RX ORDER — KETOROLAC TROMETHAMINE 30 MG/ML
INJECTION, SOLUTION INTRAMUSCULAR; INTRAVENOUS
Status: DISPENSED
Start: 2018-09-21

## (undated) RX ORDER — HYDROMORPHONE HYDROCHLORIDE 1 MG/ML
INJECTION, SOLUTION INTRAMUSCULAR; INTRAVENOUS; SUBCUTANEOUS
Status: DISPENSED
Start: 2018-09-21

## (undated) RX ORDER — LIDOCAINE 40 MG/G
CREAM TOPICAL
Status: DISPENSED
Start: 2018-09-21

## (undated) RX ORDER — KETOROLAC TROMETHAMINE 30 MG/ML
INJECTION, SOLUTION INTRAMUSCULAR; INTRAVENOUS
Status: DISPENSED
Start: 2023-06-19

## (undated) RX ORDER — FENTANYL CITRATE 50 UG/ML
INJECTION, SOLUTION INTRAMUSCULAR; INTRAVENOUS
Status: DISPENSED
Start: 2018-09-21

## (undated) RX ORDER — OXYCODONE HYDROCHLORIDE 5 MG/1
TABLET ORAL
Status: DISPENSED
Start: 2023-06-19

## (undated) RX ORDER — SCOLOPAMINE TRANSDERMAL SYSTEM 1 MG/1
PATCH, EXTENDED RELEASE TRANSDERMAL
Status: DISPENSED
Start: 2023-06-19